# Patient Record
Sex: FEMALE | Race: BLACK OR AFRICAN AMERICAN | NOT HISPANIC OR LATINO
[De-identification: names, ages, dates, MRNs, and addresses within clinical notes are randomized per-mention and may not be internally consistent; named-entity substitution may affect disease eponyms.]

---
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
## 2022-04-20 PROBLEM — Z00.00 ENCOUNTER FOR PREVENTIVE HEALTH EXAMINATION: Status: ACTIVE | Noted: 2022-04-20

## 2022-04-21 ENCOUNTER — APPOINTMENT (OUTPATIENT)
Dept: DERMATOLOGY | Facility: CLINIC | Age: 22
End: 2022-04-21
Payer: COMMERCIAL

## 2022-04-21 PROCEDURE — 99203 OFFICE O/P NEW LOW 30 MIN: CPT

## 2022-04-21 RX ORDER — ESCITALOPRAM OXALATE 5 MG/1
TABLET, FILM COATED ORAL
Refills: 0 | Status: ACTIVE | COMMUNITY

## 2022-04-21 RX ORDER — METFORMIN HYDROCHLORIDE 500 MG/1
500 TABLET, COATED ORAL
Refills: 0 | Status: ACTIVE | COMMUNITY

## 2022-04-21 RX ORDER — INSULIN LISPRO 100 [IU]/ML
(75-25) 100 INJECTION, SUSPENSION SUBCUTANEOUS
Refills: 0 | Status: ACTIVE | COMMUNITY

## 2022-04-21 NOTE — PHYSICAL EXAM
[Alert] : alert [Oriented x 3] : ~L oriented x 3 [FreeTextEntry3] : Type V skin\par \par Face: Mild to moderate small papules and pustules\par Moderate comedones-especially forehead\par Moderate small macular hyperpigmentation, especially forehead\par Back: Mild papules

## 2022-04-21 NOTE — HISTORY OF PRESENT ILLNESS
[FreeTextEntry1] : Acne [de-identified] : First visit for 21-year-old  female with a one-year history of acne on the face.  Self treated with mother's medication (? what).  Menses regular. Has occasional menstrual flares.

## 2022-08-23 ENCOUNTER — APPOINTMENT (OUTPATIENT)
Dept: DERMATOLOGY | Facility: CLINIC | Age: 22
End: 2022-08-23

## 2022-08-23 DIAGNOSIS — L30.8 OTHER SPECIFIED DERMATITIS: ICD-10-CM

## 2022-08-23 PROCEDURE — 99213 OFFICE O/P EST LOW 20 MIN: CPT

## 2022-08-23 NOTE — HISTORY OF PRESENT ILLNESS
[FreeTextEntry1] : Acne [de-identified] : Followup visit for 22-year-old  female per seen by me on April 21, 2022, with a than one year history of acne on the face. Treated with:\par Start 1% clindamycin gel to face and back in a.m. and p.m.\par Start 10% benzoyl peroxide wash in shower - not using this\par Start tretinoin cream 0.05% apply sparingly to face at night

## 2022-08-23 NOTE — PHYSICAL EXAM
[Alert] : alert [Oriented x 3] : ~L oriented x 3 [Well Nourished] : well nourished [FreeTextEntry3] : Face: Rare small papules\par Diffuse mild scaling present\par Back: Mild papules

## 2022-11-22 ENCOUNTER — APPOINTMENT (OUTPATIENT)
Dept: DERMATOLOGY | Facility: CLINIC | Age: 22
End: 2022-11-22

## 2022-11-22 DIAGNOSIS — L70.0 ACNE VULGARIS: ICD-10-CM

## 2022-11-22 PROCEDURE — 99213 OFFICE O/P EST LOW 20 MIN: CPT

## 2022-11-22 RX ORDER — CLINDAMYCIN PHOSPHATE 1 G/10ML
1 GEL TOPICAL
Qty: 1 | Refills: 5 | Status: ACTIVE | COMMUNITY
Start: 2022-04-21 | End: 1900-01-01

## 2022-11-22 RX ORDER — TRETINOIN 0.5 MG/G
0.05 CREAM TOPICAL
Qty: 1 | Refills: 3 | Status: ACTIVE | COMMUNITY
Start: 2022-04-21 | End: 1900-01-01

## 2022-11-22 RX ORDER — HYDROCORTISONE 25 MG/G
2.5 CREAM TOPICAL
Qty: 1 | Refills: 3 | Status: ACTIVE | COMMUNITY
Start: 2022-08-23 | End: 1900-01-01

## 2022-11-22 NOTE — HISTORY OF PRESENT ILLNESS
[FreeTextEntry1] : Acne [de-identified] : Follow-up visit for 22-year-old  female last seen by me on August 23, 2022, with a history of acne on the face.  Treatment modified at the last visit to:\par Decrease 1% clindamycin gel to face to once a day\par Start 10% benzoyl peroxide wash in shower\par Continue tretinoin cream 0.05%–apply sparingly to face at night\par Continue 2-1/2% hydrocortisone cream to face once or twice a day as needed for dryness -patient has not needed this

## 2022-11-22 NOTE — PHYSICAL EXAM
[Alert] : alert [Oriented x 3] : ~L oriented x 3 [Well Nourished] : well nourished [FreeTextEntry3] : Face: Rare large papule on left forehead\par Rare comedones on forehead

## 2023-02-13 ENCOUNTER — INPATIENT (INPATIENT)
Facility: HOSPITAL | Age: 23
LOS: 3 days | Discharge: HOME | DRG: 638 | End: 2023-02-17
Attending: INTERNAL MEDICINE | Admitting: SURGERY
Payer: COMMERCIAL

## 2023-02-13 VITALS — WEIGHT: 177.03 LBS | HEIGHT: 62 IN

## 2023-02-13 LAB
ACETONE SERPL-MCNC: ABNORMAL
ALBUMIN SERPL ELPH-MCNC: 4.1 G/DL — SIGNIFICANT CHANGE UP (ref 3.3–5)
ALP SERPL-CCNC: 136 U/L — HIGH (ref 40–120)
ALT FLD-CCNC: 15 U/L — SIGNIFICANT CHANGE UP (ref 12–78)
ANION GAP SERPL CALC-SCNC: 16 MMOL/L — SIGNIFICANT CHANGE UP (ref 5–17)
AST SERPL-CCNC: 10 U/L — LOW (ref 15–37)
BASE EXCESS BLDV CALC-SCNC: -22.1 MMOL/L — SIGNIFICANT CHANGE UP (ref -2–3)
BASOPHILS # BLD AUTO: 0.05 K/UL — SIGNIFICANT CHANGE UP (ref 0–0.2)
BASOPHILS NFR BLD AUTO: 0.3 % — SIGNIFICANT CHANGE UP (ref 0–2)
BILIRUB SERPL-MCNC: 0.3 MG/DL — SIGNIFICANT CHANGE UP (ref 0.2–1.2)
BUN SERPL-MCNC: 10 MG/DL — SIGNIFICANT CHANGE UP (ref 7–23)
CALCIUM SERPL-MCNC: 9.5 MG/DL — SIGNIFICANT CHANGE UP (ref 8.5–10.1)
CHLORIDE SERPL-SCNC: 106 MMOL/L — SIGNIFICANT CHANGE UP (ref 96–108)
CO2 BLDV-SCNC: 9 MMOL/L — LOW (ref 22–26)
CO2 SERPL-SCNC: 9 MMOL/L — CRITICAL LOW (ref 22–31)
CREAT SERPL-MCNC: 0.94 MG/DL — SIGNIFICANT CHANGE UP (ref 0.5–1.3)
EGFR: 89 ML/MIN/1.73M2 — SIGNIFICANT CHANGE UP
EOSINOPHIL # BLD AUTO: 0.05 K/UL — SIGNIFICANT CHANGE UP (ref 0–0.5)
EOSINOPHIL NFR BLD AUTO: 0.3 % — SIGNIFICANT CHANGE UP (ref 0–6)
GAS PNL BLDV: SIGNIFICANT CHANGE UP
GLUCOSE BLDC GLUCOMTR-MCNC: 253 MG/DL — HIGH (ref 70–99)
GLUCOSE SERPL-MCNC: 302 MG/DL — HIGH (ref 70–99)
HCG SERPL-ACNC: <1 MIU/ML — SIGNIFICANT CHANGE UP
HCO3 BLDV-SCNC: 8 MMOL/L — CRITICAL LOW (ref 22–29)
HCT VFR BLD CALC: 47.7 % — HIGH (ref 34.5–45)
HGB BLD-MCNC: 14.3 G/DL — SIGNIFICANT CHANGE UP (ref 11.5–15.5)
IMM GRANULOCYTES NFR BLD AUTO: 1.5 % — HIGH (ref 0–0.9)
LACTATE SERPL-SCNC: 1.9 MMOL/L — SIGNIFICANT CHANGE UP (ref 0.7–2)
LIDOCAIN IGE QN: 89 U/L — SIGNIFICANT CHANGE UP (ref 73–393)
LYMPHOCYTES # BLD AUTO: 1.41 K/UL — SIGNIFICANT CHANGE UP (ref 1–3.3)
LYMPHOCYTES # BLD AUTO: 8.4 % — LOW (ref 13–44)
MCHC RBC-ENTMCNC: 23.1 PG — LOW (ref 27–34)
MCHC RBC-ENTMCNC: 30 GM/DL — LOW (ref 32–36)
MCV RBC AUTO: 77.2 FL — LOW (ref 80–100)
MONOCYTES # BLD AUTO: 1.4 K/UL — HIGH (ref 0–0.9)
MONOCYTES NFR BLD AUTO: 8.4 % — SIGNIFICANT CHANGE UP (ref 2–14)
NEUTROPHILS # BLD AUTO: 13.56 K/UL — HIGH (ref 1.8–7.4)
NEUTROPHILS NFR BLD AUTO: 81.1 % — HIGH (ref 43–77)
PCO2 BLDV: 34 MMHG — LOW (ref 39–42)
PH BLDV: 7 — LOW (ref 7.32–7.43)
PHOSPHATE SERPL-MCNC: 2.4 MG/DL — LOW (ref 2.5–4.5)
PLATELET # BLD AUTO: 345 K/UL — SIGNIFICANT CHANGE UP (ref 150–400)
PO2 BLDV: 33 MMHG — SIGNIFICANT CHANGE UP (ref 25–45)
POTASSIUM SERPL-MCNC: 5 MMOL/L — SIGNIFICANT CHANGE UP (ref 3.5–5.3)
POTASSIUM SERPL-SCNC: 5 MMOL/L — SIGNIFICANT CHANGE UP (ref 3.5–5.3)
PROT SERPL-MCNC: 9.6 GM/DL — HIGH (ref 6–8.3)
RBC # BLD: 6.18 M/UL — HIGH (ref 3.8–5.2)
RBC # FLD: 15.6 % — HIGH (ref 10.3–14.5)
SAO2 % BLDV: 46 % — SIGNIFICANT CHANGE UP (ref 67–88)
SODIUM SERPL-SCNC: 131 MMOL/L — LOW (ref 135–145)
WBC # BLD: 16.72 K/UL — HIGH (ref 3.8–10.5)
WBC # FLD AUTO: 16.72 K/UL — HIGH (ref 3.8–10.5)

## 2023-02-13 PROCEDURE — 93010 ELECTROCARDIOGRAM REPORT: CPT

## 2023-02-13 PROCEDURE — 74177 CT ABD & PELVIS W/CONTRAST: CPT | Mod: 26,MA

## 2023-02-13 PROCEDURE — 99285 EMERGENCY DEPT VISIT HI MDM: CPT

## 2023-02-13 RX ORDER — ACETAMINOPHEN 500 MG
1000 TABLET ORAL ONCE
Refills: 0 | Status: COMPLETED | OUTPATIENT
Start: 2023-02-13 | End: 2023-02-13

## 2023-02-13 RX ORDER — SODIUM CHLORIDE 9 MG/ML
1000 INJECTION, SOLUTION INTRAVENOUS
Refills: 0 | Status: DISCONTINUED | OUTPATIENT
Start: 2023-02-13 | End: 2023-02-14

## 2023-02-13 RX ORDER — INSULIN HUMAN 100 [IU]/ML
3 INJECTION, SOLUTION SUBCUTANEOUS
Qty: 100 | Refills: 0 | Status: DISCONTINUED | OUTPATIENT
Start: 2023-02-13 | End: 2023-02-15

## 2023-02-13 RX ORDER — SODIUM BICARBONATE 1 MEQ/ML
50 SYRINGE (ML) INTRAVENOUS ONCE
Refills: 0 | Status: COMPLETED | OUTPATIENT
Start: 2023-02-13 | End: 2023-02-13

## 2023-02-13 RX ORDER — SODIUM CHLORIDE 9 MG/ML
2000 INJECTION INTRAMUSCULAR; INTRAVENOUS; SUBCUTANEOUS ONCE
Refills: 0 | Status: COMPLETED | OUTPATIENT
Start: 2023-02-13 | End: 2023-02-13

## 2023-02-13 RX ADMIN — SODIUM CHLORIDE 2000 MILLILITER(S): 9 INJECTION INTRAMUSCULAR; INTRAVENOUS; SUBCUTANEOUS at 22:48

## 2023-02-13 RX ADMIN — Medication 400 MILLIGRAM(S): at 22:47

## 2023-02-13 NOTE — ED STATDOCS - PROGRESS NOTE DETAILS
Yadira Rosales for Dr. Castle:  22 year old female with PMHx of T1DM, depression presents to the ED c/o LLQ abd pain since Saturday. Family reports patient didn't know when her last BM was on Saturday, so she took a laxative and was able to have a BM but hasn't since then. Was seen at  for the pain and UA showed ketones in urine. Patient not currently c/o pain at this time. Sending to [facility] for further eval.

## 2023-02-13 NOTE — ED PROVIDER NOTE - CLINICAL SUMMARY MEDICAL DECISION MAKING FREE TEXT BOX
2310:  results noted.   pt in DKA.   case d/w ICU PA and will evaluate pt 21 y/o female with h/o type I DM in ED c/o abd pain and constipation x 3 days.   states on insulin and BS has been 200-300.   pt states also with decrease PO intake due to constipation.   pt denies any fever, HA, cp, sob, n/v/d.   tolerating PO.   no sick contacts.   states today sugar was 360.   states last small BM 3 days ago after taking laxatives.   pt with soft abd but distended.  h/o DM with mildly elevated FS.  will check EKG, CXR, CT, labs, UA, IVF< meds and likely admit    2310:  results noted.   pt in DKA.   case d/w ICU PA and will evaluate pt 23 y/o female with h/o type I DM in ED c/o abd pain and constipation x 3 days.   states on insulin and BS has been 200-300.   pt states also with decrease PO intake due to constipation.   pt denies any fever, HA, cp, sob, n/v/d.   tolerating PO.   no sick contacts.   states today sugar was 360.   states last small BM 3 days ago after taking laxatives.   pt with soft abd but distended.  h/o DM with mildly elevated FS.  will check EKG, CXR, CT, labs, UA, IVF< meds and likely admit    2310:  results noted.   pt in DKA.   case d/w ICU PA and will evaluate pt    0040:  pt evaluated by [facility] ICU PA and will admit to ICU under Melita

## 2023-02-13 NOTE — ED PROVIDER NOTE - OBJECTIVE STATEMENT
21 y/o female with h/o type I DM in ED c/o abd pain and constipation x 3 days.   states on insulin and BS has been 200-300.   pt states also with decrease PO intake due to constipation.   pt denies any fever, HA, cp, sob, n/v/d.   tolerating PO.   no sick contacts.   states today sugar was 360.   states last small BM 3 days ago after taking laxatives.

## 2023-02-13 NOTE — ED ADULT TRIAGE NOTE - CHIEF COMPLAINT QUOTE
Ambulatory to the ER with c/o LLQ abdominal pain x 2 days and constipation. Patient seen in urgent care today UA showed ketones in urine patient has hx of diabetes, sent to ER for further eval. No medication taken for pain today

## 2023-02-14 DIAGNOSIS — E10.10 TYPE 1 DIABETES MELLITUS WITH KETOACIDOSIS WITHOUT COMA: ICD-10-CM

## 2023-02-14 LAB
ANION GAP SERPL CALC-SCNC: 12 MMOL/L — SIGNIFICANT CHANGE UP (ref 5–17)
ANION GAP SERPL CALC-SCNC: 16 MMOL/L — SIGNIFICANT CHANGE UP (ref 5–17)
ANION GAP SERPL CALC-SCNC: 17 MMOL/L — SIGNIFICANT CHANGE UP (ref 5–17)
ANION GAP SERPL CALC-SCNC: 8 MMOL/L — SIGNIFICANT CHANGE UP (ref 5–17)
APPEARANCE UR: CLEAR — SIGNIFICANT CHANGE UP
BACTERIA # UR AUTO: ABNORMAL
BASE EXCESS BLDV CALC-SCNC: -21.8 MMOL/L — SIGNIFICANT CHANGE UP
BASOPHILS # BLD AUTO: 0.04 K/UL — SIGNIFICANT CHANGE UP (ref 0–0.2)
BASOPHILS NFR BLD AUTO: 0.3 % — SIGNIFICANT CHANGE UP (ref 0–2)
BILIRUB UR-MCNC: NEGATIVE — SIGNIFICANT CHANGE UP
BUN SERPL-MCNC: 5 MG/DL — LOW (ref 7–23)
BUN SERPL-MCNC: 6 MG/DL — LOW (ref 7–23)
BUN SERPL-MCNC: 8 MG/DL — SIGNIFICANT CHANGE UP (ref 7–23)
BUN SERPL-MCNC: 9 MG/DL — SIGNIFICANT CHANGE UP (ref 7–23)
CALCIUM SERPL-MCNC: 8.3 MG/DL — LOW (ref 8.5–10.1)
CALCIUM SERPL-MCNC: 8.4 MG/DL — LOW (ref 8.5–10.1)
CALCIUM SERPL-MCNC: 8.5 MG/DL — SIGNIFICANT CHANGE UP (ref 8.5–10.1)
CALCIUM SERPL-MCNC: 8.8 MG/DL — SIGNIFICANT CHANGE UP (ref 8.5–10.1)
CHLORIDE SERPL-SCNC: 110 MMOL/L — HIGH (ref 96–108)
CHLORIDE SERPL-SCNC: 112 MMOL/L — HIGH (ref 96–108)
CHLORIDE SERPL-SCNC: 112 MMOL/L — HIGH (ref 96–108)
CHLORIDE SERPL-SCNC: 113 MMOL/L — HIGH (ref 96–108)
CO2 BLDV-SCNC: 8 MMOL/L — LOW (ref 22–26)
CO2 SERPL-SCNC: 12 MMOL/L — LOW (ref 22–31)
CO2 SERPL-SCNC: 17 MMOL/L — LOW (ref 22–31)
CO2 SERPL-SCNC: 6 MMOL/L — CRITICAL LOW (ref 22–31)
CO2 SERPL-SCNC: 7 MMOL/L — CRITICAL LOW (ref 22–31)
COLOR SPEC: YELLOW — SIGNIFICANT CHANGE UP
CREAT SERPL-MCNC: 0.55 MG/DL — SIGNIFICANT CHANGE UP (ref 0.5–1.3)
CREAT SERPL-MCNC: 0.69 MG/DL — SIGNIFICANT CHANGE UP (ref 0.5–1.3)
CREAT SERPL-MCNC: 0.81 MG/DL — SIGNIFICANT CHANGE UP (ref 0.5–1.3)
CREAT SERPL-MCNC: 0.91 MG/DL — SIGNIFICANT CHANGE UP (ref 0.5–1.3)
DIFF PNL FLD: ABNORMAL
EGFR: 106 ML/MIN/1.73M2 — SIGNIFICANT CHANGE UP
EGFR: 127 ML/MIN/1.73M2 — SIGNIFICANT CHANGE UP
EGFR: 134 ML/MIN/1.73M2 — SIGNIFICANT CHANGE UP
EGFR: 92 ML/MIN/1.73M2 — SIGNIFICANT CHANGE UP
EOSINOPHIL # BLD AUTO: 0 K/UL — SIGNIFICANT CHANGE UP (ref 0–0.5)
EOSINOPHIL NFR BLD AUTO: 0 % — SIGNIFICANT CHANGE UP (ref 0–6)
EPI CELLS # UR: ABNORMAL
GAS PNL BLDV: SIGNIFICANT CHANGE UP
GLUCOSE BLDC GLUCOMTR-MCNC: 117 MG/DL — HIGH (ref 70–99)
GLUCOSE BLDC GLUCOMTR-MCNC: 122 MG/DL — HIGH (ref 70–99)
GLUCOSE BLDC GLUCOMTR-MCNC: 149 MG/DL — HIGH (ref 70–99)
GLUCOSE BLDC GLUCOMTR-MCNC: 163 MG/DL — HIGH (ref 70–99)
GLUCOSE BLDC GLUCOMTR-MCNC: 167 MG/DL — HIGH (ref 70–99)
GLUCOSE BLDC GLUCOMTR-MCNC: 168 MG/DL — HIGH (ref 70–99)
GLUCOSE BLDC GLUCOMTR-MCNC: 169 MG/DL — HIGH (ref 70–99)
GLUCOSE BLDC GLUCOMTR-MCNC: 171 MG/DL — HIGH (ref 70–99)
GLUCOSE BLDC GLUCOMTR-MCNC: 177 MG/DL — HIGH (ref 70–99)
GLUCOSE BLDC GLUCOMTR-MCNC: 185 MG/DL — HIGH (ref 70–99)
GLUCOSE BLDC GLUCOMTR-MCNC: 194 MG/DL — HIGH (ref 70–99)
GLUCOSE BLDC GLUCOMTR-MCNC: 198 MG/DL — HIGH (ref 70–99)
GLUCOSE BLDC GLUCOMTR-MCNC: 202 MG/DL — HIGH (ref 70–99)
GLUCOSE BLDC GLUCOMTR-MCNC: 204 MG/DL — HIGH (ref 70–99)
GLUCOSE BLDC GLUCOMTR-MCNC: 212 MG/DL — HIGH (ref 70–99)
GLUCOSE BLDC GLUCOMTR-MCNC: 213 MG/DL — HIGH (ref 70–99)
GLUCOSE BLDC GLUCOMTR-MCNC: 217 MG/DL — HIGH (ref 70–99)
GLUCOSE BLDC GLUCOMTR-MCNC: 234 MG/DL — HIGH (ref 70–99)
GLUCOSE BLDC GLUCOMTR-MCNC: 244 MG/DL — HIGH (ref 70–99)
GLUCOSE BLDC GLUCOMTR-MCNC: 257 MG/DL — HIGH (ref 70–99)
GLUCOSE SERPL-MCNC: 123 MG/DL — HIGH (ref 70–99)
GLUCOSE SERPL-MCNC: 217 MG/DL — HIGH (ref 70–99)
GLUCOSE SERPL-MCNC: 218 MG/DL — HIGH (ref 70–99)
GLUCOSE SERPL-MCNC: 288 MG/DL — HIGH (ref 70–99)
GLUCOSE UR QL: 1000 MG/DL
HCO3 BLDV-SCNC: 8 MMOL/L — CRITICAL LOW (ref 22–29)
HCT VFR BLD CALC: 40.4 % — SIGNIFICANT CHANGE UP (ref 34.5–45)
HGB BLD-MCNC: 12.3 G/DL — SIGNIFICANT CHANGE UP (ref 11.5–15.5)
IMM GRANULOCYTES NFR BLD AUTO: 1 % — HIGH (ref 0–0.9)
KETONES UR-MCNC: ABNORMAL
LEUKOCYTE ESTERASE UR-ACNC: ABNORMAL
LYMPHOCYTES # BLD AUTO: 1.78 K/UL — SIGNIFICANT CHANGE UP (ref 1–3.3)
LYMPHOCYTES # BLD AUTO: 14.1 % — SIGNIFICANT CHANGE UP (ref 13–44)
MAGNESIUM SERPL-MCNC: 2 MG/DL — SIGNIFICANT CHANGE UP (ref 1.6–2.6)
MCHC RBC-ENTMCNC: 22.9 PG — LOW (ref 27–34)
MCHC RBC-ENTMCNC: 30.4 GM/DL — LOW (ref 32–36)
MCV RBC AUTO: 75.2 FL — LOW (ref 80–100)
MONOCYTES # BLD AUTO: 1.59 K/UL — HIGH (ref 0–0.9)
MONOCYTES NFR BLD AUTO: 12.6 % — SIGNIFICANT CHANGE UP (ref 2–14)
NEUTROPHILS # BLD AUTO: 9.07 K/UL — HIGH (ref 1.8–7.4)
NEUTROPHILS NFR BLD AUTO: 72 % — SIGNIFICANT CHANGE UP (ref 43–77)
NITRITE UR-MCNC: NEGATIVE — SIGNIFICANT CHANGE UP
PCO2 BLDV: 28 MMHG — LOW (ref 39–42)
PH BLDV: 7.04 — LOW (ref 7.32–7.43)
PH UR: 5 — SIGNIFICANT CHANGE UP (ref 5–8)
PHOSPHATE SERPL-MCNC: 0.9 MG/DL — CRITICAL LOW (ref 2.5–4.5)
PHOSPHATE SERPL-MCNC: 1.4 MG/DL — LOW (ref 2.5–4.5)
PHOSPHATE SERPL-MCNC: 2.2 MG/DL — LOW (ref 2.5–4.5)
PLATELET # BLD AUTO: 290 K/UL — SIGNIFICANT CHANGE UP (ref 150–400)
PO2 BLDV: 48 MMHG — SIGNIFICANT CHANGE UP
POTASSIUM SERPL-MCNC: 3.1 MMOL/L — LOW (ref 3.5–5.3)
POTASSIUM SERPL-MCNC: 3.6 MMOL/L — SIGNIFICANT CHANGE UP (ref 3.5–5.3)
POTASSIUM SERPL-MCNC: 3.6 MMOL/L — SIGNIFICANT CHANGE UP (ref 3.5–5.3)
POTASSIUM SERPL-MCNC: 4.1 MMOL/L — SIGNIFICANT CHANGE UP (ref 3.5–5.3)
POTASSIUM SERPL-SCNC: 3.1 MMOL/L — LOW (ref 3.5–5.3)
POTASSIUM SERPL-SCNC: 3.6 MMOL/L — SIGNIFICANT CHANGE UP (ref 3.5–5.3)
POTASSIUM SERPL-SCNC: 3.6 MMOL/L — SIGNIFICANT CHANGE UP (ref 3.5–5.3)
POTASSIUM SERPL-SCNC: 4.1 MMOL/L — SIGNIFICANT CHANGE UP (ref 3.5–5.3)
PROT UR-MCNC: 15
RAPID RVP RESULT: SIGNIFICANT CHANGE UP
RBC # BLD: 5.37 M/UL — HIGH (ref 3.8–5.2)
RBC # FLD: 15.4 % — HIGH (ref 10.3–14.5)
RBC CASTS # UR COMP ASSIST: ABNORMAL /HPF (ref 0–4)
SAO2 % BLDV: 76.5 % — SIGNIFICANT CHANGE UP
SARS-COV-2 RNA SPEC QL NAA+PROBE: SIGNIFICANT CHANGE UP
SODIUM SERPL-SCNC: 133 MMOL/L — LOW (ref 135–145)
SODIUM SERPL-SCNC: 136 MMOL/L — SIGNIFICANT CHANGE UP (ref 135–145)
SODIUM SERPL-SCNC: 136 MMOL/L — SIGNIFICANT CHANGE UP (ref 135–145)
SODIUM SERPL-SCNC: 137 MMOL/L — SIGNIFICANT CHANGE UP (ref 135–145)
SP GR SPEC: 1.02 — SIGNIFICANT CHANGE UP (ref 1.01–1.02)
UROBILINOGEN FLD QL: NEGATIVE — SIGNIFICANT CHANGE UP
WBC # BLD: 12.6 K/UL — HIGH (ref 3.8–10.5)
WBC # FLD AUTO: 12.6 K/UL — HIGH (ref 3.8–10.5)
WBC UR QL: ABNORMAL /HPF (ref 0–5)

## 2023-02-14 PROCEDURE — 80048 BASIC METABOLIC PNL TOTAL CA: CPT

## 2023-02-14 PROCEDURE — 87491 CHLMYD TRACH DNA AMP PROBE: CPT

## 2023-02-14 PROCEDURE — 84100 ASSAY OF PHOSPHORUS: CPT

## 2023-02-14 PROCEDURE — 76830 TRANSVAGINAL US NON-OB: CPT

## 2023-02-14 PROCEDURE — 85025 COMPLETE CBC W/AUTO DIFF WBC: CPT

## 2023-02-14 PROCEDURE — 83036 HEMOGLOBIN GLYCOSYLATED A1C: CPT

## 2023-02-14 PROCEDURE — 87040 BLOOD CULTURE FOR BACTERIA: CPT

## 2023-02-14 PROCEDURE — 80053 COMPREHEN METABOLIC PANEL: CPT

## 2023-02-14 PROCEDURE — 87086 URINE CULTURE/COLONY COUNT: CPT

## 2023-02-14 PROCEDURE — 99291 CRITICAL CARE FIRST HOUR: CPT

## 2023-02-14 PROCEDURE — 87591 N.GONORRHOEAE DNA AMP PROB: CPT

## 2023-02-14 PROCEDURE — 82962 GLUCOSE BLOOD TEST: CPT

## 2023-02-14 PROCEDURE — 82803 BLOOD GASES ANY COMBINATION: CPT

## 2023-02-14 PROCEDURE — 36415 COLL VENOUS BLD VENIPUNCTURE: CPT

## 2023-02-14 PROCEDURE — 85027 COMPLETE CBC AUTOMATED: CPT

## 2023-02-14 PROCEDURE — 81001 URINALYSIS AUTO W/SCOPE: CPT

## 2023-02-14 PROCEDURE — 83735 ASSAY OF MAGNESIUM: CPT

## 2023-02-14 PROCEDURE — 93975 VASCULAR STUDY: CPT

## 2023-02-14 RX ORDER — SODIUM BICARBONATE 1 MEQ/ML
0.19 SYRINGE (ML) INTRAVENOUS
Qty: 150 | Refills: 0 | Status: DISCONTINUED | OUTPATIENT
Start: 2023-02-14 | End: 2023-02-14

## 2023-02-14 RX ORDER — CEFTRIAXONE 500 MG/1
1000 INJECTION, POWDER, FOR SOLUTION INTRAMUSCULAR; INTRAVENOUS EVERY 24 HOURS
Refills: 0 | Status: DISCONTINUED | OUTPATIENT
Start: 2023-02-14 | End: 2023-02-17

## 2023-02-14 RX ORDER — ESCITALOPRAM OXALATE 10 MG/1
10 TABLET, FILM COATED ORAL DAILY
Refills: 0 | Status: DISCONTINUED | OUTPATIENT
Start: 2023-02-14 | End: 2023-02-17

## 2023-02-14 RX ORDER — MORPHINE SULFATE 50 MG/1
2 CAPSULE, EXTENDED RELEASE ORAL EVERY 4 HOURS
Refills: 0 | Status: DISCONTINUED | OUTPATIENT
Start: 2023-02-14 | End: 2023-02-15

## 2023-02-14 RX ORDER — HYDROXYZINE HCL 10 MG
1 TABLET ORAL
Qty: 0 | Refills: 0 | DISCHARGE

## 2023-02-14 RX ORDER — SODIUM CHLORIDE 9 MG/ML
1000 INJECTION, SOLUTION INTRAVENOUS ONCE
Refills: 0 | Status: COMPLETED | OUTPATIENT
Start: 2023-02-14 | End: 2023-02-14

## 2023-02-14 RX ORDER — METOCLOPRAMIDE HCL 10 MG
10 TABLET ORAL ONCE
Refills: 0 | Status: COMPLETED | OUTPATIENT
Start: 2023-02-14 | End: 2023-02-14

## 2023-02-14 RX ORDER — INSULIN DEGLUDEC 100 U/ML
36 INJECTION, SOLUTION SUBCUTANEOUS
Qty: 0 | Refills: 0 | DISCHARGE

## 2023-02-14 RX ORDER — CHLORHEXIDINE GLUCONATE 213 G/1000ML
1 SOLUTION TOPICAL
Refills: 0 | Status: DISCONTINUED | OUTPATIENT
Start: 2023-02-14 | End: 2023-02-17

## 2023-02-14 RX ORDER — SODIUM CHLORIDE 9 MG/ML
1000 INJECTION, SOLUTION INTRAVENOUS
Refills: 0 | Status: DISCONTINUED | OUTPATIENT
Start: 2023-02-14 | End: 2023-02-15

## 2023-02-14 RX ORDER — SODIUM BICARBONATE 1 MEQ/ML
50 SYRINGE (ML) INTRAVENOUS ONCE
Refills: 0 | Status: COMPLETED | OUTPATIENT
Start: 2023-02-14 | End: 2023-02-14

## 2023-02-14 RX ORDER — ENOXAPARIN SODIUM 100 MG/ML
40 INJECTION SUBCUTANEOUS EVERY 24 HOURS
Refills: 0 | Status: DISCONTINUED | OUTPATIENT
Start: 2023-02-14 | End: 2023-02-15

## 2023-02-14 RX ORDER — ESCITALOPRAM OXALATE 10 MG/1
1 TABLET, FILM COATED ORAL
Qty: 0 | Refills: 0 | DISCHARGE

## 2023-02-14 RX ORDER — POTASSIUM PHOSPHATE, MONOBASIC POTASSIUM PHOSPHATE, DIBASIC 236; 224 MG/ML; MG/ML
30 INJECTION, SOLUTION INTRAVENOUS ONCE
Refills: 0 | Status: COMPLETED | OUTPATIENT
Start: 2023-02-14 | End: 2023-02-14

## 2023-02-14 RX ORDER — CEFTRIAXONE 500 MG/1
1000 INJECTION, POWDER, FOR SOLUTION INTRAMUSCULAR; INTRAVENOUS EVERY 24 HOURS
Refills: 0 | Status: DISCONTINUED | OUTPATIENT
Start: 2023-02-14 | End: 2023-02-14

## 2023-02-14 RX ORDER — DIAZEPAM 5 MG
0.5 TABLET ORAL ONCE
Refills: 0 | Status: DISCONTINUED | OUTPATIENT
Start: 2023-02-14 | End: 2023-02-14

## 2023-02-14 RX ORDER — POLYETHYLENE GLYCOL 3350 17 G/17G
17 POWDER, FOR SOLUTION ORAL ONCE
Refills: 0 | Status: COMPLETED | OUTPATIENT
Start: 2023-02-14 | End: 2023-02-14

## 2023-02-14 RX ORDER — ACETAMINOPHEN 500 MG
650 TABLET ORAL EVERY 6 HOURS
Refills: 0 | Status: DISCONTINUED | OUTPATIENT
Start: 2023-02-14 | End: 2023-02-17

## 2023-02-14 RX ORDER — MORPHINE SULFATE 50 MG/1
2 CAPSULE, EXTENDED RELEASE ORAL ONCE
Refills: 0 | Status: DISCONTINUED | OUTPATIENT
Start: 2023-02-14 | End: 2023-02-14

## 2023-02-14 RX ORDER — LEVOCETIRIZINE DIHYDROCHLORIDE 0.5 MG/ML
1 SOLUTION ORAL
Qty: 0 | Refills: 0 | DISCHARGE

## 2023-02-14 RX ADMIN — SODIUM CHLORIDE 125 MILLILITER(S): 9 INJECTION, SOLUTION INTRAVENOUS at 11:37

## 2023-02-14 RX ADMIN — Medication 50 MILLILITER(S): at 03:14

## 2023-02-14 RX ADMIN — Medication 650 MILLIGRAM(S): at 16:52

## 2023-02-14 RX ADMIN — CHLORHEXIDINE GLUCONATE 1 APPLICATION(S): 213 SOLUTION TOPICAL at 05:23

## 2023-02-14 RX ADMIN — SODIUM CHLORIDE 125 MILLILITER(S): 9 INJECTION, SOLUTION INTRAVENOUS at 20:17

## 2023-02-14 RX ADMIN — CEFTRIAXONE 1000 MILLIGRAM(S): 500 INJECTION, POWDER, FOR SOLUTION INTRAMUSCULAR; INTRAVENOUS at 05:05

## 2023-02-14 RX ADMIN — Medication 0.5 MILLIGRAM(S): at 03:06

## 2023-02-14 RX ADMIN — SODIUM CHLORIDE 125 MILLILITER(S): 9 INJECTION, SOLUTION INTRAVENOUS at 02:58

## 2023-02-14 RX ADMIN — MORPHINE SULFATE 2 MILLIGRAM(S): 50 CAPSULE, EXTENDED RELEASE ORAL at 06:17

## 2023-02-14 RX ADMIN — SODIUM CHLORIDE 125 MILLILITER(S): 9 INJECTION, SOLUTION INTRAVENOUS at 04:19

## 2023-02-14 RX ADMIN — Medication 10 MILLIGRAM(S): at 02:29

## 2023-02-14 RX ADMIN — ESCITALOPRAM OXALATE 10 MILLIGRAM(S): 10 TABLET, FILM COATED ORAL at 13:03

## 2023-02-14 RX ADMIN — Medication 50 MILLIEQUIVALENT(S): at 00:21

## 2023-02-14 RX ADMIN — Medication 650 MILLIGRAM(S): at 17:49

## 2023-02-14 RX ADMIN — Medication 10 MILLIGRAM(S): at 02:28

## 2023-02-14 RX ADMIN — POLYETHYLENE GLYCOL 3350 17 GRAM(S): 17 POWDER, FOR SOLUTION ORAL at 09:42

## 2023-02-14 RX ADMIN — INSULIN HUMAN 4 UNIT(S)/HR: 100 INJECTION, SOLUTION SUBCUTANEOUS at 02:21

## 2023-02-14 RX ADMIN — INSULIN HUMAN 4 UNIT(S)/HR: 100 INJECTION, SOLUTION SUBCUTANEOUS at 11:37

## 2023-02-14 RX ADMIN — ENOXAPARIN SODIUM 40 MILLIGRAM(S): 100 INJECTION SUBCUTANEOUS at 02:28

## 2023-02-14 RX ADMIN — POTASSIUM PHOSPHATE, MONOBASIC POTASSIUM PHOSPHATE, DIBASIC 83.33 MILLIMOLE(S): 236; 224 INJECTION, SOLUTION INTRAVENOUS at 16:27

## 2023-02-14 RX ADMIN — MORPHINE SULFATE 2 MILLIGRAM(S): 50 CAPSULE, EXTENDED RELEASE ORAL at 06:32

## 2023-02-14 RX ADMIN — SODIUM CHLORIDE 1000 MILLILITER(S): 9 INJECTION, SOLUTION INTRAVENOUS at 01:21

## 2023-02-14 RX ADMIN — POTASSIUM PHOSPHATE, MONOBASIC POTASSIUM PHOSPHATE, DIBASIC 83.33 MILLIMOLE(S): 236; 224 INJECTION, SOLUTION INTRAVENOUS at 09:42

## 2023-02-14 NOTE — PROGRESS NOTE ADULT - ASSESSMENT
22 year old known Type I Diabetic admitted with DKA.  no clear instigating cause,   has some abdominal pain,   ct ? pyelo, check  urine clture on rocephin  miralax for constipation  replace magnesium/phosphate  insulin drip

## 2023-02-14 NOTE — H&P ADULT - HISTORY OF PRESENT ILLNESS
22F with h/o type I DM in ED c/o abd pain and constipation x 3 days.      Abdominal pain began on Saturday and patient states that she didn't know when her last bowel movement was. Parents tried giving her laxatives and resulted in a very small BM on Sat, but no passing or any further movements or gas since. She has been burping up food that she has eaten over previous days and complains of abdominal distension. Patients glucose is typically addressed with basal bolus insulin with 36U basal at night and bolus insulin adjustments calculated based on her meals. She has had decreased PO intake secondary to stomach pain and thus insulin dosing has been sporadic. Upon arrival in the ED. she presents with ketoacidosis with a bicarbonate level of 9 and acetone in the blood with ketonuria and glucosuria.

## 2023-02-14 NOTE — PHARMACOTHERAPY INTERVENTION NOTE - COMMENTS
Medication reconciliation complete.  Home medications reviewed with the patient and her mother at bedside and confirmed against Dr. First medhx.    Home Medications:  HumaLOG 100 units/mL subcutaneous solution: With meals (14 Feb 2023 09:45)  hydrOXYzine hydrochloride 25 mg oral tablet: 1 tab(s) orally once a day (at bedtime), As Needed (14 Feb 2023 09:45)  levocetirizine 5 mg oral tablet: 1 tab(s) orally once a day (14 Feb 2023 09:45)  Lexapro 10 mg oral tablet: 1 tab(s) orally once a day (14 Feb 2023 09:45)  metFORMIN 500 mg oral tablet: 1 tab(s) orally 2 times a day (14 Feb 2023 09:45)  Tresiba 100 units/mL subcutaneous solution: 36 unit(s) subcutaneous once a day (in the morning) (14 Feb 2023 09:45)

## 2023-02-14 NOTE — PROVIDER CONTACT NOTE (EICU) - SITUATION
23 y/o female with h/o type I DM in ED c/o abd pain and constipation x 3 days.   states on insulin and BS has been 200-300.   pt states also with decrease PO intake due to constipation.   pt denies any fever, HA, cp, sob, n/v/d.   tolerating PO.   no sick contacts.   states today sugar was 360.   states last small BM 3 days ago after taking laxatives.  Case discussed with the ICU PA.

## 2023-02-14 NOTE — H&P ADULT - NSHPLABSRESULTS_GEN_ALL_CORE
< from: CT Abdomen and Pelvis w/ IV Cont (02.13.23 @ 22:42) >      ACC: [id number] EXAM:  CT ABDOMEN AND PELVIS IC   ORDERED BY: LIA HERNÁNDEZ     PROCEDURE DATE:  02/13/2023          INTERPRETATION:  CLINICAL INFORMATION: Left lower quadrant pain.    COMPARISON: None.    CONTRAST/COMPLICATIONS:  IV Contrast: Omnipaque 350  90 cc administered   10 cc discarded  Oral Contrast: NONE  Complications: None reported at time of study completion    PROCEDURE:  CT of the Abdomen and Pelvis was performed.  Sagittal and coronal reformats were performed.    FINDINGS: Exam is mildly degraded by motion artifact.  LOWER CHEST: Within normal limits.    LIVER: Within normal limits.  BILE DUCTS: Normal caliber.  GALLBLADDER: Within normal limits.  SPLEEN: Within normal limits.  PANCREAS: Within normal limits.  ADRENALS: Within normal limits.  KIDNEYS/URETERS: No hydronephrosis. Question linear area of   hypoenhancement within the right interpolar kidney (example 602, 52).    BLADDER: Within normal limits.  REPRODUCTIVE ORGANS: Left corpus luteum. Malpositioned IUD in the lower   uterine segment and extending into the cervix with angulated appearance   of the arms in uncertain location.    BOWEL: No bowel obstruction. Distended stomach. Appendix minimally   distended up to 7 mm with fecal material. Noappendiceal inflammation.  PERITONEUM: Small free pelvic fluid.  VESSELS: Within normal limits.  RETROPERITONEUM/LYMPH NODES: No lymphadenopathy.  ABDOMINAL WALL: Within normal limits.  BONES: Within normal limits.    IMPRESSION:  Exam mildly degraded by motion artifact.    Question linear area of hypoenhancement in the right kidney versus   artifact. Recommend correlation with urinalysis for infection.    Distended stomach.    Minimally distended appendix without associated periappendiceal   inflammation.    Malpositioned IUD.        --- End of Report ---            KIRIT RODRIGUEZ MD; Attending Radiologist  This document has been electronically signed. Feb 13 2023 11:43PM    < end of copied text >

## 2023-02-14 NOTE — H&P ADULT - NSHPPHYSICALEXAM_GEN_ALL_CORE
PHYSICAL EXAM:  GENERAL: NAD, lying in bed comfortably  HEAD:  Atraumatic, Normocephalic  EYES: EOMI, PERRL, conjunctiva and sclera clear  ENT: Moist mucous membranes  NECK: Supple, No JVD  CHEST/LUNG: Clear to auscultation bilaterally; No rales, rhonchi, wheezing, or rubs. Unlabored respirations  HEART: Regular rate and rhythm; No murmurs, rubs, or gallops  ABDOMEN: Soft, Nontender, mild TTP to LLQ, distended   EXTREMITIES:  2+ Peripheral Pulses, brisk capillary refill. No clubbing, cyanosis, or edema  NERVOUS SYSTEM:  Alert & Oriented X3, speech clear. No facial droop, tongue protrusion midline. Answers questions appropriately. Full and equal 5/5 strength B/L upper and lower extremities. +reflexes B/L LE. Sensation intact. No deficits   MSK: FROM x 4 extremities   SKIN: No rashes or lesions

## 2023-02-14 NOTE — ED ADULT NURSE NOTE - NSIMPLEMENTINTERV_GEN_ALL_ED
Implemented All Universal Safety Interventions:  [place] to call system. Call bell, personal items and telephone within reach. Instruct patient to call for assistance. Room bathroom lighting operational. Non-slip footwear when patient is off stretcher. Physically safe environment: no spills, clutter or unnecessary equipment. Stretcher in lowest position, wheels locked, appropriate side rails in place.

## 2023-02-14 NOTE — PATIENT PROFILE ADULT - FALL HARM RISK - HARM RISK INTERVENTIONS
Assistance with ambulation/Communicate Risk of Fall with Harm to all staff/Reinforce activity limits and safety measures with patient and family/Tailored Fall Risk Interventions/Visual Cue: Yellow wristband and red socks/Bed in lowest position, wheels locked, appropriate side rails in place/Call bell, personal items and telephone in reach/Instruct patient to call for assistance before getting out of bed or chair/Non-slip footwear when patient is out of bed/[place] to call system/Physically safe environment - no spills, clutter or unnecessary equipment/Purposeful Proactive Rounding/Room/bathroom lighting operational, light cord in reach Assistance with ambulation/Assistance OOB with selected safe patient handling equipment/Communicate Risk of Fall with Harm to all staff/Monitor for mental status changes/Monitor gait and stability/Move patient closer to nurses' station/Reinforce activity limits and safety measures with patient and family/Reorient to person, place and time as needed/Review medications for side effects contributing to fall risk/Sit up slowly, dangle for a short time, stand at bedside before walking/Tailored Fall Risk Interventions/Toileting schedule using arm’s reach rule for commode and bathroom/Use of alarms - bed, chair and/or voice tab/Visual Cue: Yellow wristband and red socks/Bed in lowest position, wheels locked, appropriate side rails in place/Call bell, personal items and telephone in reach/Instruct patient to call for assistance before getting out of bed or chair/Non-slip footwear when patient is out of bed/[place] to call system/Physically safe environment - no spills, clutter or unnecessary equipment/Purposeful Proactive Rounding/Room/bathroom lighting operational, light cord in reach

## 2023-02-14 NOTE — PATIENT PROFILE ADULT - NSPROGENSOURCEINFO_GEN_A_NUR
patient/family documentation/consultation with other physicians/direct patient care (not related to procedure)/consult w/ pt's family directly relating to pts condition/additional history taking/interpretation of diagnostic studies

## 2023-02-14 NOTE — H&P ADULT - ASSESSMENT
Type 1 diabetic presenting with ketoacidosis, gastroparesis, abdominal distension with reduced PO intake and possible superimposed starvation ketosis on mild DKA. ABCs stable     - 2 amps bicarb given total   - Insulin gtt started; protocol in place   - 3L IVF given   - D5LR at 125/hr   - trending labs  - CT abdomin and pelvis - no SBO noted - possible perinephric stranding correlating with leukocytosis and positive UA; urine cx pending; ceftriaxone started   - dvt ppx  - NPO  - reglan and dulcolax for gastroparesis and constipation

## 2023-02-15 LAB
ANION GAP SERPL CALC-SCNC: 9 MMOL/L — SIGNIFICANT CHANGE UP (ref 5–17)
BUN SERPL-MCNC: 3 MG/DL — LOW (ref 7–23)
CALCIUM SERPL-MCNC: 8.5 MG/DL — SIGNIFICANT CHANGE UP (ref 8.5–10.1)
CHLORIDE SERPL-SCNC: 109 MMOL/L — HIGH (ref 96–108)
CO2 SERPL-SCNC: 18 MMOL/L — LOW (ref 22–31)
CREAT SERPL-MCNC: 0.5 MG/DL — SIGNIFICANT CHANGE UP (ref 0.5–1.3)
CULTURE RESULTS: SIGNIFICANT CHANGE UP
EGFR: 137 ML/MIN/1.73M2 — SIGNIFICANT CHANGE UP
GLUCOSE BLDC GLUCOMTR-MCNC: 137 MG/DL — HIGH (ref 70–99)
GLUCOSE BLDC GLUCOMTR-MCNC: 181 MG/DL — HIGH (ref 70–99)
GLUCOSE BLDC GLUCOMTR-MCNC: 197 MG/DL — HIGH (ref 70–99)
GLUCOSE BLDC GLUCOMTR-MCNC: 210 MG/DL — HIGH (ref 70–99)
GLUCOSE BLDC GLUCOMTR-MCNC: 211 MG/DL — HIGH (ref 70–99)
GLUCOSE BLDC GLUCOMTR-MCNC: 226 MG/DL — HIGH (ref 70–99)
GLUCOSE BLDC GLUCOMTR-MCNC: 227 MG/DL — HIGH (ref 70–99)
GLUCOSE BLDC GLUCOMTR-MCNC: 252 MG/DL — HIGH (ref 70–99)
GLUCOSE BLDC GLUCOMTR-MCNC: 256 MG/DL — HIGH (ref 70–99)
GLUCOSE BLDC GLUCOMTR-MCNC: 263 MG/DL — HIGH (ref 70–99)
GLUCOSE SERPL-MCNC: 213 MG/DL — HIGH (ref 70–99)
HCT VFR BLD CALC: 35.7 % — SIGNIFICANT CHANGE UP (ref 34.5–45)
HGB BLD-MCNC: 11.4 G/DL — LOW (ref 11.5–15.5)
MCHC RBC-ENTMCNC: 23.1 PG — LOW (ref 27–34)
MCHC RBC-ENTMCNC: 31.9 GM/DL — LOW (ref 32–36)
MCV RBC AUTO: 72.4 FL — LOW (ref 80–100)
PHOSPHATE SERPL-MCNC: 1.7 MG/DL — LOW (ref 2.5–4.5)
PLATELET # BLD AUTO: 276 K/UL — SIGNIFICANT CHANGE UP (ref 150–400)
POTASSIUM SERPL-MCNC: 2.8 MMOL/L — CRITICAL LOW (ref 3.5–5.3)
POTASSIUM SERPL-SCNC: 2.8 MMOL/L — CRITICAL LOW (ref 3.5–5.3)
RBC # BLD: 4.93 M/UL — SIGNIFICANT CHANGE UP (ref 3.8–5.2)
RBC # FLD: 15 % — HIGH (ref 10.3–14.5)
SODIUM SERPL-SCNC: 136 MMOL/L — SIGNIFICANT CHANGE UP (ref 135–145)
SPECIMEN SOURCE: SIGNIFICANT CHANGE UP
WBC # BLD: 8.81 K/UL — SIGNIFICANT CHANGE UP (ref 3.8–10.5)
WBC # FLD AUTO: 8.81 K/UL — SIGNIFICANT CHANGE UP (ref 3.8–10.5)

## 2023-02-15 PROCEDURE — 99233 SBSQ HOSP IP/OBS HIGH 50: CPT

## 2023-02-15 RX ORDER — DEXTROSE 50 % IN WATER 50 %
15 SYRINGE (ML) INTRAVENOUS ONCE
Refills: 0 | Status: DISCONTINUED | OUTPATIENT
Start: 2023-02-15 | End: 2023-02-17

## 2023-02-15 RX ORDER — GLUCAGON INJECTION, SOLUTION 0.5 MG/.1ML
1 INJECTION, SOLUTION SUBCUTANEOUS ONCE
Refills: 0 | Status: DISCONTINUED | OUTPATIENT
Start: 2023-02-15 | End: 2023-02-17

## 2023-02-15 RX ORDER — POTASSIUM CHLORIDE 20 MEQ
40 PACKET (EA) ORAL EVERY 4 HOURS
Refills: 0 | Status: COMPLETED | OUTPATIENT
Start: 2023-02-15 | End: 2023-02-15

## 2023-02-15 RX ORDER — DEXTROSE 50 % IN WATER 50 %
25 SYRINGE (ML) INTRAVENOUS ONCE
Refills: 0 | Status: DISCONTINUED | OUTPATIENT
Start: 2023-02-15 | End: 2023-02-17

## 2023-02-15 RX ORDER — INSULIN LISPRO 100/ML
VIAL (ML) SUBCUTANEOUS
Refills: 0 | Status: DISCONTINUED | OUTPATIENT
Start: 2023-02-15 | End: 2023-02-15

## 2023-02-15 RX ORDER — DEXTROSE 50 % IN WATER 50 %
12.5 SYRINGE (ML) INTRAVENOUS ONCE
Refills: 0 | Status: DISCONTINUED | OUTPATIENT
Start: 2023-02-15 | End: 2023-02-17

## 2023-02-15 RX ORDER — INSULIN LISPRO 100/ML
VIAL (ML) SUBCUTANEOUS
Refills: 0 | Status: DISCONTINUED | OUTPATIENT
Start: 2023-02-15 | End: 2023-02-17

## 2023-02-15 RX ORDER — SODIUM CHLORIDE 9 MG/ML
1000 INJECTION, SOLUTION INTRAVENOUS
Refills: 0 | Status: DISCONTINUED | OUTPATIENT
Start: 2023-02-15 | End: 2023-02-17

## 2023-02-15 RX ORDER — ONDANSETRON 8 MG/1
4 TABLET, FILM COATED ORAL ONCE
Refills: 0 | Status: COMPLETED | OUTPATIENT
Start: 2023-02-15 | End: 2023-02-15

## 2023-02-15 RX ORDER — POTASSIUM CHLORIDE 20 MEQ
40 PACKET (EA) ORAL EVERY 4 HOURS
Refills: 0 | Status: DISCONTINUED | OUTPATIENT
Start: 2023-02-15 | End: 2023-02-15

## 2023-02-15 RX ORDER — INSULIN GLARGINE 100 [IU]/ML
36 INJECTION, SOLUTION SUBCUTANEOUS ONCE
Refills: 0 | Status: COMPLETED | OUTPATIENT
Start: 2023-02-15 | End: 2023-02-15

## 2023-02-15 RX ORDER — ACETAMINOPHEN 500 MG
1000 TABLET ORAL ONCE
Refills: 0 | Status: COMPLETED | OUTPATIENT
Start: 2023-02-15 | End: 2023-02-15

## 2023-02-15 RX ORDER — INSULIN LISPRO 100/ML
VIAL (ML) SUBCUTANEOUS AT BEDTIME
Refills: 0 | Status: DISCONTINUED | OUTPATIENT
Start: 2023-02-15 | End: 2023-02-17

## 2023-02-15 RX ORDER — INSULIN GLARGINE 100 [IU]/ML
36 INJECTION, SOLUTION SUBCUTANEOUS EVERY MORNING
Refills: 0 | Status: DISCONTINUED | OUTPATIENT
Start: 2023-02-15 | End: 2023-02-17

## 2023-02-15 RX ORDER — INSULIN LISPRO 100/ML
2 VIAL (ML) SUBCUTANEOUS ONCE
Refills: 0 | Status: COMPLETED | OUTPATIENT
Start: 2023-02-15 | End: 2023-02-15

## 2023-02-15 RX ADMIN — ESCITALOPRAM OXALATE 10 MILLIGRAM(S): 10 TABLET, FILM COATED ORAL at 11:07

## 2023-02-15 RX ADMIN — Medication 6: at 15:34

## 2023-02-15 RX ADMIN — Medication 400 MILLIGRAM(S): at 01:02

## 2023-02-15 RX ADMIN — Medication 1000 MILLIGRAM(S): at 01:17

## 2023-02-15 RX ADMIN — ENOXAPARIN SODIUM 40 MILLIGRAM(S): 100 INJECTION SUBCUTANEOUS at 02:28

## 2023-02-15 RX ADMIN — Medication 40 MILLIEQUIVALENT(S): at 11:07

## 2023-02-15 RX ADMIN — Medication 62.5 MILLIMOLE(S): at 11:08

## 2023-02-15 RX ADMIN — Medication 650 MILLIGRAM(S): at 06:41

## 2023-02-15 RX ADMIN — Medication 40 MILLIEQUIVALENT(S): at 15:55

## 2023-02-15 RX ADMIN — INSULIN GLARGINE 36 UNIT(S): 100 INJECTION, SOLUTION SUBCUTANEOUS at 11:08

## 2023-02-15 RX ADMIN — INSULIN HUMAN 3 UNIT(S)/HR: 100 INJECTION, SOLUTION SUBCUTANEOUS at 02:28

## 2023-02-15 RX ADMIN — Medication 40 MILLIEQUIVALENT(S): at 21:37

## 2023-02-15 RX ADMIN — SODIUM CHLORIDE 125 MILLILITER(S): 9 INJECTION, SOLUTION INTRAVENOUS at 06:11

## 2023-02-15 RX ADMIN — Medication 400 MILLIGRAM(S): at 13:10

## 2023-02-15 RX ADMIN — Medication 1000 MILLIGRAM(S): at 13:30

## 2023-02-15 RX ADMIN — INSULIN HUMAN 3 UNIT(S)/HR: 100 INJECTION, SOLUTION SUBCUTANEOUS at 06:30

## 2023-02-15 RX ADMIN — Medication 2 UNIT(S): at 22:49

## 2023-02-15 RX ADMIN — CEFTRIAXONE 1000 MILLIGRAM(S): 500 INJECTION, POWDER, FOR SOLUTION INTRAMUSCULAR; INTRAVENOUS at 06:12

## 2023-02-15 RX ADMIN — CHLORHEXIDINE GLUCONATE 1 APPLICATION(S): 213 SOLUTION TOPICAL at 06:12

## 2023-02-15 RX ADMIN — Medication 650 MILLIGRAM(S): at 07:10

## 2023-02-15 RX ADMIN — ONDANSETRON 4 MILLIGRAM(S): 8 TABLET, FILM COATED ORAL at 12:28

## 2023-02-15 NOTE — PROGRESS NOTE ADULT - ASSESSMENT
22 year old known Type I Diabetic admitted with DKA.  no clear instigating cause, although ct ? pyelo had temp to 101 last night  ct ? pyelo, check  urine clture on rocephin  miralax for constipation had bowel movement  replace magnesium/phosphate  anion gap closed, will transfer to [facility], was on tresiba at home 36 q am   22 year old known Type I Diabetic admitted with DKA.  no clear instigating cause, although ct ? pyelo had temp to 101 last night  ct ? pyelo, check  urine clture on rocephin  miralax for constipation had bowel movement  replace magnesium/phosphate  anion gap closed, will transfer to [facility], was on tresiba at home 36 q am    can transferred to floor signed out to hospitalist Thalia

## 2023-02-16 LAB
A1C WITH ESTIMATED AVERAGE GLUCOSE RESULT: 15.2 % — HIGH (ref 4–5.6)
ANION GAP SERPL CALC-SCNC: 9 MMOL/L — SIGNIFICANT CHANGE UP (ref 5–17)
BUN SERPL-MCNC: 3 MG/DL — LOW (ref 7–23)
CALCIUM SERPL-MCNC: 8.4 MG/DL — LOW (ref 8.5–10.1)
CHLORIDE SERPL-SCNC: 107 MMOL/L — SIGNIFICANT CHANGE UP (ref 96–108)
CO2 SERPL-SCNC: 22 MMOL/L — SIGNIFICANT CHANGE UP (ref 22–31)
CREAT SERPL-MCNC: 0.47 MG/DL — LOW (ref 0.5–1.3)
EGFR: 139 ML/MIN/1.73M2 — SIGNIFICANT CHANGE UP
ESTIMATED AVERAGE GLUCOSE: 390 MG/DL — HIGH (ref 68–114)
GLUCOSE BLDC GLUCOMTR-MCNC: 196 MG/DL — HIGH (ref 70–99)
GLUCOSE BLDC GLUCOMTR-MCNC: 206 MG/DL — HIGH (ref 70–99)
GLUCOSE BLDC GLUCOMTR-MCNC: 226 MG/DL — HIGH (ref 70–99)
GLUCOSE BLDC GLUCOMTR-MCNC: 236 MG/DL — HIGH (ref 70–99)
GLUCOSE SERPL-MCNC: 206 MG/DL — HIGH (ref 70–99)
HCT VFR BLD CALC: 33.5 % — LOW (ref 34.5–45)
HGB BLD-MCNC: 10.7 G/DL — LOW (ref 11.5–15.5)
MAGNESIUM SERPL-MCNC: 1.7 MG/DL — SIGNIFICANT CHANGE UP (ref 1.6–2.6)
MCHC RBC-ENTMCNC: 23.1 PG — LOW (ref 27–34)
MCHC RBC-ENTMCNC: 31.9 GM/DL — LOW (ref 32–36)
MCV RBC AUTO: 72.4 FL — LOW (ref 80–100)
PHOSPHATE SERPL-MCNC: 2.5 MG/DL — SIGNIFICANT CHANGE UP (ref 2.5–4.5)
PLATELET # BLD AUTO: 276 K/UL — SIGNIFICANT CHANGE UP (ref 150–400)
POTASSIUM SERPL-MCNC: 2.7 MMOL/L — CRITICAL LOW (ref 3.5–5.3)
POTASSIUM SERPL-SCNC: 2.7 MMOL/L — CRITICAL LOW (ref 3.5–5.3)
RBC # BLD: 4.63 M/UL — SIGNIFICANT CHANGE UP (ref 3.8–5.2)
RBC # FLD: 14.9 % — HIGH (ref 10.3–14.5)
SODIUM SERPL-SCNC: 138 MMOL/L — SIGNIFICANT CHANGE UP (ref 135–145)
WBC # BLD: 7.77 K/UL — SIGNIFICANT CHANGE UP (ref 3.8–10.5)
WBC # FLD AUTO: 7.77 K/UL — SIGNIFICANT CHANGE UP (ref 3.8–10.5)

## 2023-02-16 PROCEDURE — 93975 VASCULAR STUDY: CPT | Mod: 26

## 2023-02-16 PROCEDURE — 76830 TRANSVAGINAL US NON-OB: CPT | Mod: 26

## 2023-02-16 PROCEDURE — 99233 SBSQ HOSP IP/OBS HIGH 50: CPT

## 2023-02-16 RX ORDER — POTASSIUM CHLORIDE 20 MEQ
40 PACKET (EA) ORAL EVERY 4 HOURS
Refills: 0 | Status: COMPLETED | OUTPATIENT
Start: 2023-02-16 | End: 2023-02-17

## 2023-02-16 RX ORDER — POTASSIUM CHLORIDE 20 MEQ
40 PACKET (EA) ORAL
Refills: 0 | Status: DISCONTINUED | OUTPATIENT
Start: 2023-02-16 | End: 2023-02-16

## 2023-02-16 RX ORDER — POTASSIUM CHLORIDE 20 MEQ
10 PACKET (EA) ORAL
Refills: 0 | Status: COMPLETED | OUTPATIENT
Start: 2023-02-16 | End: 2023-02-16

## 2023-02-16 RX ORDER — POTASSIUM CHLORIDE 20 MEQ
40 PACKET (EA) ORAL EVERY 4 HOURS
Refills: 0 | Status: DISCONTINUED | OUTPATIENT
Start: 2023-02-16 | End: 2023-02-16

## 2023-02-16 RX ADMIN — Medication 40 MILLIEQUIVALENT(S): at 18:42

## 2023-02-16 RX ADMIN — CEFTRIAXONE 1000 MILLIGRAM(S): 500 INJECTION, POWDER, FOR SOLUTION INTRAMUSCULAR; INTRAVENOUS at 05:03

## 2023-02-16 RX ADMIN — INSULIN GLARGINE 36 UNIT(S): 100 INJECTION, SOLUTION SUBCUTANEOUS at 07:47

## 2023-02-16 RX ADMIN — CHLORHEXIDINE GLUCONATE 1 APPLICATION(S): 213 SOLUTION TOPICAL at 06:58

## 2023-02-16 RX ADMIN — Medication 40 MILLIEQUIVALENT(S): at 21:13

## 2023-02-16 RX ADMIN — Medication 100 MILLIEQUIVALENT(S): at 16:24

## 2023-02-16 RX ADMIN — Medication 4: at 17:06

## 2023-02-16 RX ADMIN — Medication 4: at 12:43

## 2023-02-16 RX ADMIN — Medication 2: at 07:46

## 2023-02-16 RX ADMIN — ESCITALOPRAM OXALATE 10 MILLIGRAM(S): 10 TABLET, FILM COATED ORAL at 11:59

## 2023-02-16 RX ADMIN — Medication 100 MILLIEQUIVALENT(S): at 14:38

## 2023-02-16 RX ADMIN — Medication 100 MILLIEQUIVALENT(S): at 12:44

## 2023-02-16 NOTE — CONSULT NOTE ADULT - ASSESSMENT
22y G0 LMP 2/3/2023  admitted to hospitalist team  for management of DKA and gastroparesis in setting of Type I DM  HD#4    #abdominal pain  -improved since started bowel regimen, possibly contributed to by constipation  -malpositioned IUD on CT; however, patient reporting no abdominal pain, AUB, or symptoms associated with IUD prior to this admission  -pending pelvic sono to determine position  -urine gonorrhea/chlamydia to assess for PID  -discussed with patient and mother the differential diagnosis and management options  -once sono results, will do pelvic exam to visualize strings; may discontinue if IUD is at cervical os or not positioned in intrauterine cavity on sono  -if urine GC/CT and pelvic exam raise clinical suspicion of PID, will treat with antibiotics but IUD does not need to be taken out in setting of PID  -Dispo: pending sono and physical exam    discussed with attending Dr Hathaway   22y G0 LMP 2/3/2023  admitted to hospitalist team  for management of DKA and gastroparesis in setting of Type I DM  HD#4    #abdominal pain  -improved since started bowel regimen, possibly contributed to by constipation  -malpositioned IUD on CT; however, patient reporting no abdominal pain, AUB, or symptoms associated with IUD prior to this admission  -pending pelvic sono to determine position  -urine gonorrhea/chlamydia to assess for PID  -discussed with patient and mother the differential diagnosis and management options  -once sono results, will do pelvic exam to visualize strings; may discontinue if IUD is at cervical os or not positioned in intrauterine cavity on sono  -if urine GC/CT and pelvic exam raise clinical suspicion of PID, will treat with antibiotics but IUD does not need to be taken out in setting of PID  -Dispo: pending sono and physical exam      -----------02-16-23 @ 18:04    Patient re-evaluated after sono results  - US showing IUD in lower uterine segment, in intrauterine cavity, not embedded in myometrium or in vaginal canal  - physical exam consistent with sono findings  - patient and mother also mentioned that the patient is having urge incontinence, feeling incomplete emptying, and feels like "overflowing" and currently has UTI; patient already with diabetic neuropathy, but no known diagnosis of neurogenic bladder. encouraged to keep a voiding journal; patient's mother is considering making appointment with her own urogyn, Dr Turner; consider post-void residual with bladder scan upon next void in-hospital    not requiring acute gyn intervention at this time  follow up with gyn/urogyn  please reconsult as necessary      discussed with attending Dr Hathaway

## 2023-02-16 NOTE — PROVIDER CONTACT NOTE (CRITICAL VALUE NOTIFICATION) - PERSON GIVING RESULT:
ELIESER Fernández
Lebron Weeks
lab
Lab: ANNMARIE alejandra
[facility]
Lab [facility]
Aleksandar Lange

## 2023-02-16 NOTE — CONSULT NOTE ADULT - SUBJECTIVE AND OBJECTIVE BOX
22y G0 LMP 2/3/2023  admitted to hospitalist team  for management of DKA and gastroparesis in setting of Type I DM  HD#4    initially presented on 2/13 to [facility] with abdominal pain, distention, decreased appetite and constipation for three days. She was found to have DKA and was admitted for management.     GYN consulted due to abdominal pain in setting of CT abdomen/pelvis noted "malpositioned IUD"      Upon evaluation, the patient reports that she was given suppositories inpatient and abdominal pain has improved since starting to have loose bowel movements on Tuesday (HD#2). Last BM on HD#3.    Today she is tolerating PO, abdominal pain is minimal, and has been passing gas. She reports that she her IUD was placed a year or two ago and it was recommended as a contraceptive and also to manage abnormal uterine bleeding. She and her mother report that her menses has improved, she does not use 1 pad an hr, she has no symptoms of anemia, she gets her period regularly, and that her abdominal pain / bleeding is not bothersome. Mother called Gynecologist at bedside, office reports the patient received a Kyleena which is due out 4/2024    Gyn Hx:  menarche: 12-13  reports pap smear at 18 years old NILM; STI testing at that time yielded +chlamydia, s/p treatment and passed test of cure; in unsure of last STI testing and has been sexually active in the interim  Denies history of cysts or fibroids    OB Hx: denies      PAST MEDICAL HISTORY:  DM I (diabetes mellitus, type I)      PAST SURGICAL HISTORY: denies    SOCIAL:  never smoker, social drinker, no history of recreational drug use     acetaminophen     Tablet .. 650 milliGRAM(s) Oral every 6 hours PRN  cefTRIAXone Injectable. 1000 milliGRAM(s) IV Push every 24 hours  chlorhexidine 4% Liquid 1 Application(s) Topical <User Schedule>  dextrose 5%. 1000 milliLiter(s) IV Continuous <Continuous>  dextrose 5%. 1000 milliLiter(s) IV Continuous <Continuous>  dextrose 50% Injectable 25 Gram(s) IV Push once  dextrose 50% Injectable 12.5 Gram(s) IV Push once  dextrose 50% Injectable 25 Gram(s) IV Push once  dextrose Oral Gel 15 Gram(s) Oral once PRN  escitalopram 10 milliGRAM(s) Oral daily  glucagon  Injectable 1 milliGRAM(s) IntraMuscular once  insulin glargine Injectable (LANTUS) 36 Unit(s) SubCutaneous every morning  insulin lispro (ADMELOG) corrective regimen sliding scale   SubCutaneous at bedtime  insulin lispro (ADMELOG) corrective regimen sliding scale.   SubCutaneous three times a day before meals  potassium chloride    Tablet ER 40 milliEquivalent(s) Oral every 4 hours  potassium chloride  10 mEq/100 mL IVPB 10 milliEquivalent(s) IV Intermittent every 1 hour    Allergies    No Known Allergies    Intolerances        Vital Signs Last 24 Hrs  T(C): 37.1 (16 Feb 2023 15:25), Max: 37.1 (15 Feb 2023 17:28)  T(F): 98.8 (16 Feb 2023 15:25), Max: 98.8 (16 Feb 2023 15:25)  HR: 87 (16 Feb 2023 15:25) (87 - 99)  BP: 118/67 (16 Feb 2023 15:25) (108/58 - 122/72)  BP(mean): 77 (15 Feb 2023 16:00) (77 - 77)  RR: 18 (16 Feb 2023 15:25) (0 - 18)  SpO2: 97% (16 Feb 2023 15:25) (97% - 100%)    Parameters below as of 16 Feb 2023 15:25  Patient On (Oxygen Delivery Method): room air        PHYSICAL EXAM:  GENERAL: NAD  ABDOMEN: Soft, Nontender, Nondistended; Bowel sounds present  PELVIC: will examine pending ultrasound        EXTERNAL GENITALIA        VAGINA         CERVIX        UTERUS        ADNEXA    LABS:                        10.7   7.77  )-----------( 276      ( 16 Feb 2023 07:07 )             33.5     02-16    138  |  107  |  3<L>  ----------------------------<  206<H>  2.7<LL>   |  22  |  0.47<L>    Ca    8.4<L>      16 Feb 2023 07:07  Phos  2.5     02-16  Mg     1.7     02-16                RADIOLOGY STUDIES:  < from: CT Abdomen and Pelvis w/ IV Cont (02.13.23 @ 22:42) >  PROCEDURE DATE:  02/13/2023          INTERPRETATION:  CLINICAL INFORMATION: Left lower quadrant pain.    COMPARISON: None.    CONTRAST/COMPLICATIONS:  IV Contrast: Omnipaque 350  90 cc administered   10 cc discarded  Oral Contrast: NONE  Complications: None reported at time of study completion    PROCEDURE:  CT of the Abdomen and Pelvis was performed.  Sagittal and coronal reformats were performed.    FINDINGS: Exam is mildly degraded by motion artifact.  LOWER CHEST: Within normal limits.    LIVER: Within normal limits.  BILE DUCTS: Normal caliber.  GALLBLADDER: Within normal limits.  SPLEEN: Within normal limits.  PANCREAS: Within normal limits.  ADRENALS: Within normal limits.  KIDNEYS/URETERS: No hydronephrosis. Question linear area of   hypoenhancement within the right interpolar kidney (example 602, 52).    BLADDER: Within normal limits.  REPRODUCTIVE ORGANS: Left corpus luteum. Malpositioned IUD in the lower   uterine segment and extending into the cervix with angulated appearance   of the arms in uncertain location.    BOWEL: No bowel obstruction. Distended stomach. Appendix minimally   distended up to 7 mm with fecal material. Noappendiceal inflammation.  PERITONEUM: Small free pelvic fluid.  VESSELS: Within normal limits.  RETROPERITONEUM/LYMPH NODES: No lymphadenopathy.  ABDOMINAL WALL: Within normal limits.  BONES: Within normal limits.    IMPRESSION:  Exam mildly degraded by motion artifact.    Question linear area of hypoenhancement in the right kidney versus   artifact. Recommend correlation with urinalysis for infection.    Distended stomach.    Minimally distended appendix without associated periappendiceal   inflammation.    Malpositioned IUD.        --- End of Report ---            KIRIT RODRIGUEZ MD; Attending Radiologist  This document has been electronically signed. Feb 13 2023 11:43PM    < end of copied text >     22y G0 LMP 2/3/2023  admitted to hospitalist team  for management of DKA and gastroparesis in setting of Type I DM  HD#4    initially presented on 2/13 to [facility] with abdominal pain, distention, decreased appetite and constipation for three days. She was found to have DKA and was admitted for management.     GYN consulted due to abdominal pain in setting of CT abdomen/pelvis noted "malpositioned IUD"      Upon evaluation, the patient reports that she was given suppositories inpatient and abdominal pain has improved since starting to have loose bowel movements on Tuesday (HD#2). Last BM on HD#3.    Today she is tolerating PO, abdominal pain is minimal, and has been passing gas. She reports that she her IUD was placed a year or two ago and it was recommended as a contraceptive and also to manage abnormal uterine bleeding. She and her mother report that her menses has improved, she does not use 1 pad an hr, she has no symptoms of anemia, she gets her period regularly, and that her abdominal pain / bleeding is not bothersome. Mother called Gynecologist at bedside, office reports the patient received a Kyleena which is due out 4/2024    Gyn Hx:  menarche: 12-13  reports pap smear at 18 years old NILM; STI testing at that time yielded +chlamydia, s/p treatment and passed test of cure; in unsure of last STI testing and has been sexually active in the interim  Denies history of cysts or fibroids    OB Hx: denies      PAST MEDICAL HISTORY:  DM I (diabetes mellitus, type I)      PAST SURGICAL HISTORY: denies    SOCIAL:  never smoker, social drinker, no history of recreational drug use     acetaminophen     Tablet .. 650 milliGRAM(s) Oral every 6 hours PRN  cefTRIAXone Injectable. 1000 milliGRAM(s) IV Push every 24 hours  chlorhexidine 4% Liquid 1 Application(s) Topical <User Schedule>  dextrose 5%. 1000 milliLiter(s) IV Continuous <Continuous>  dextrose 5%. 1000 milliLiter(s) IV Continuous <Continuous>  dextrose 50% Injectable 25 Gram(s) IV Push once  dextrose 50% Injectable 12.5 Gram(s) IV Push once  dextrose 50% Injectable 25 Gram(s) IV Push once  dextrose Oral Gel 15 Gram(s) Oral once PRN  escitalopram 10 milliGRAM(s) Oral daily  glucagon  Injectable 1 milliGRAM(s) IntraMuscular once  insulin glargine Injectable (LANTUS) 36 Unit(s) SubCutaneous every morning  insulin lispro (ADMELOG) corrective regimen sliding scale   SubCutaneous at bedtime  insulin lispro (ADMELOG) corrective regimen sliding scale.   SubCutaneous three times a day before meals  potassium chloride    Tablet ER 40 milliEquivalent(s) Oral every 4 hours  potassium chloride  10 mEq/100 mL IVPB 10 milliEquivalent(s) IV Intermittent every 1 hour    Allergies    No Known Allergies    Intolerances        Vital Signs Last 24 Hrs  T(C): 37.1 (16 Feb 2023 15:25), Max: 37.1 (15 Feb 2023 17:28)  T(F): 98.8 (16 Feb 2023 15:25), Max: 98.8 (16 Feb 2023 15:25)  HR: 87 (16 Feb 2023 15:25) (87 - 99)  BP: 118/67 (16 Feb 2023 15:25) (108/58 - 122/72)  BP(mean): 77 (15 Feb 2023 16:00) (77 - 77)  RR: 18 (16 Feb 2023 15:25) (0 - 18)  SpO2: 97% (16 Feb 2023 15:25) (97% - 100%)    Parameters below as of 16 Feb 2023 15:25  Patient On (Oxygen Delivery Method): room air        PHYSICAL EXAM:  GENERAL: NAD  ABDOMEN: Soft, Nontender, Nondistended; Bowel sounds present  PELVIC: will examine pending ultrasound        EXTERNAL GENITALIA WNL        VAGINA no blood or discharge in vaginal vault         CERVIX strings visualized, IUD in        UTERUS anteverted, 7cm        ADNEXA nonpalpable    LABS:                        10.7   7.77  )-----------( 276      ( 16 Feb 2023 07:07 )             33.5     02-16    138  |  107  |  3<L>  ----------------------------<  206<H>  2.7<LL>   |  22  |  0.47<L>    Ca    8.4<L>      16 Feb 2023 07:07  Phos  2.5     02-16  Mg     1.7     02-16                RADIOLOGY STUDIES:  < from: CT Abdomen and Pelvis w/ IV Cont (02.13.23 @ 22:42) >  PROCEDURE DATE:  02/13/2023          INTERPRETATION:  CLINICAL INFORMATION: Left lower quadrant pain.    COMPARISON: None.    CONTRAST/COMPLICATIONS:  IV Contrast: Omnipaque 350  90 cc administered   10 cc discarded  Oral Contrast: NONE  Complications: None reported at time of study completion    PROCEDURE:  CT of the Abdomen and Pelvis was performed.  Sagittal and coronal reformats were performed.    FINDINGS: Exam is mildly degraded by motion artifact.  LOWER CHEST: Within normal limits.    LIVER: Within normal limits.  BILE DUCTS: Normal caliber.  GALLBLADDER: Within normal limits.  SPLEEN: Within normal limits.  PANCREAS: Within normal limits.  ADRENALS: Within normal limits.  KIDNEYS/URETERS: No hydronephrosis. Question linear area of   hypoenhancement within the right interpolar kidney (example 602, 52).    BLADDER: Within normal limits.  REPRODUCTIVE ORGANS: Left corpus luteum. Malpositioned IUD in the lower   uterine segment and extending into the cervix with angulated appearance   of the arms in uncertain location.    BOWEL: No bowel obstruction. Distended stomach. Appendix minimally   distended up to 7 mm with fecal material. Noappendiceal inflammation.  PERITONEUM: Small free pelvic fluid.  VESSELS: Within normal limits.  RETROPERITONEUM/LYMPH NODES: No lymphadenopathy.  ABDOMINAL WALL: Within normal limits.  BONES: Within normal limits.    IMPRESSION:  Exam mildly degraded by motion artifact.    Question linear area of hypoenhancement in the right kidney versus   artifact. Recommend correlation with urinalysis for infection.    Distended stomach.    Minimally distended appendix without associated periappendiceal   inflammation.    Malpositioned IUD.        --- End of Report ---            KIRIT RODRIGUEZ MD; Attending Radiologist  This document has been electronically signed. Feb 13 2023 11:43PM    < end of copied text >

## 2023-02-16 NOTE — PROGRESS NOTE ADULT - SUBJECTIVE AND OBJECTIVE BOX
Patient is a 22y old  Female who presents with a chief complaint of DKA (15 Feb 2023 09:21)      SUBJECTIVE:   HPI:  22F with h/o type I DM in ED c/o abd pain and constipation x 3 days.      Abdominal pain began on Saturday and patient states that she didn't know when her last bowel movement was. Parents tried giving her laxatives and resulted in a very small BM on Sat, but no passing or any further movements or gas since. She has been burping up food that she has eaten over previous days and complains of abdominal distension. Patients glucose is typically addressed with basal bolus insulin with 36U basal at night and bolus insulin adjustments calculated based on her meals. She has had decreased PO intake secondary to stomach pain and thus insulin dosing has been sporadic. Upon arrival in the ED. she presents with ketoacidosis with a bicarbonate level of 9 and acetone in the blood with ketonuria and glucosuria.  (14 Feb 2023 04:08)      2/16: sitting up in bed, tolerating IV potassium and PO. management and dispo planning discussed with pt and mother       REVIEW OF SYSTEMS:    CONSTITUTIONAL: No weakness, fevers or chills  EYES/ENT: No visual changes;  No vertigo or throat pain   NECK: No pain or stiffness  RESPIRATORY: No cough, wheezing, hemoptysis; No shortness of breath  CARDIOVASCULAR: No chest pain or palpitations  GASTROINTESTINAL: No abdominal or epigastric pain. No nausea, vomiting, or hematemesis; No diarrhea or constipation. No melena or hematochezia.  GENITOURINARY: No dysuria, frequency or hematuria  NEUROLOGICAL: No numbness or weakness  SKIN: No itching, burning, rashes, or lesions   All other review of systems is negative unless indicated above      Vital Signs Last 24 Hrs  T(C): 36.5 (16 Feb 2023 08:11), Max: 37.1 (15 Feb 2023 17:28)  T(F): 97.7 (16 Feb 2023 08:11), Max: 98.7 (15 Feb 2023 17:28)  HR: 89 (16 Feb 2023 08:11) (87 - 104)  BP: 108/58 (16 Feb 2023 08:11) (101/55 - 122/72)  BP(mean): 77 (15 Feb 2023 16:00) (68 - 77)  RR: 18 (16 Feb 2023 08:11) (0 - 18)  SpO2: 99% (16 Feb 2023 08:11) (98% - 100%)    Parameters below as of 16 Feb 2023 08:11  Patient On (Oxygen Delivery Method): room air        I&O's Summary      CAPILLARY BLOOD GLUCOSE    POCT Blood Glucose.: 226 mg/dL (16 Feb 2023 12:40)  POCT Blood Glucose.: 196 mg/dL (16 Feb 2023 07:35)  POCT Blood Glucose.: 252 mg/dL (15 Feb 2023 22:48)  POCT Blood Glucose.: 226 mg/dL (15 Feb 2023 21:27)  POCT Blood Glucose.: 263 mg/dL (15 Feb 2023 15:12)      PHYSICAL EXAM:    Constitutional: NAD, awake and alert, well-developed  HEENT: PERR, EOMI, Normal Hearing, MMM  Neck: Soft and supple, No LAD, No JVD  Respiratory: Breath sounds are clear bilaterally, No wheezing, rales or rhonchi  Cardiovascular: S1 and S2, regular rate and rhythm, no Murmurs, gallops or rubs  Gastrointestinal: Bowel Sounds present, soft, nontender, nondistended, no guarding, no rebound  Extremities: No peripheral edema  Vascular: 2+ peripheral pulses  Neurological: A/O x 3, no focal deficits  Musculoskeletal: 5/5 strength b/l upper and lower extremities  Skin: No rashes      MEDICATIONS:  MEDICATIONS  (STANDING):  cefTRIAXone Injectable. 1000 milliGRAM(s) IV Push every 24 hours  chlorhexidine 4% Liquid 1 Application(s) Topical <User Schedule>  dextrose 5%. 1000 milliLiter(s) (50 mL/Hr) IV Continuous <Continuous>  dextrose 5%. 1000 milliLiter(s) (100 mL/Hr) IV Continuous <Continuous>  dextrose 50% Injectable 25 Gram(s) IV Push once  dextrose 50% Injectable 12.5 Gram(s) IV Push once  dextrose 50% Injectable 25 Gram(s) IV Push once  escitalopram 10 milliGRAM(s) Oral daily  glucagon  Injectable 1 milliGRAM(s) IntraMuscular once  insulin glargine Injectable (LANTUS) 36 Unit(s) SubCutaneous every morning  insulin lispro (ADMELOG) corrective regimen sliding scale   SubCutaneous at bedtime  insulin lispro (ADMELOG) corrective regimen sliding scale.   SubCutaneous three times a day before meals  potassium chloride    Tablet ER 40 milliEquivalent(s) Oral every 4 hours  potassium chloride  10 mEq/100 mL IVPB 10 milliEquivalent(s) IV Intermittent every 1 hour      LABS: All Labs Reviewed:                        10.7   7.77  )-----------( 276      ( 16 Feb 2023 07:07 )             33.5     02-16    138  |  107  |  3<L>  ----------------------------<  206<H>  2.7<LL>   |  22  |  0.47<L>    Ca    8.4<L>      16 Feb 2023 07:07  Phos  2.5     02-16  Mg     1.7     02-16          RADIOLOGY/EKG:    < from: CT Abdomen and Pelvis w/ IV Cont (02.13.23 @ 22:42) >  IMPRESSION:  Exam mildly degraded by motion artifact.    Question linear area of hypoenhancement in the right kidney versus   artifact. Recommend correlation with urinalysis for infection.    Distended stomach.    Minimally distended appendix without associated periappendiceal   inflammation.    Malpositioned IUD.        --- End of Report ---    KIRIT RODRIGUEZ MD; Attending Radiologist  This document has been electronically signed. Feb 13 2023 11:43PM    < end of copied text >    
Events Overnight:  Patient gap closed abdominal pain better, temp to 101    HPI:  22F with h/o type I DM in ED c/o abd pain and constipation x 3 days.    Patient takes tresiba 36 units in am and metformin, with some reflux.   In ED was found to have PH 0f 7.o and in DKA. The patient has been taking her insulin  ct abdomen pelvis, ? pyelonephritis, u/a only 6-10 wbx and no complaints of dysuria         Review of Systems:  Other Review of Systems:  constipation improved , weakness, no fevers, no chills, no dysuria, no sob      has been taking her Insulin  ROS otherwise negative    PMH:        Type I diabetes since age 19    Family hx. Has brother who is diabetic    Social works as bus aid    MEDICATIONS  (STANDING):  acetaminophen   IVPB .. 1000 milliGRAM(s) IV Intermittent once  cefTRIAXone Injectable. 1000 milliGRAM(s) IV Push every 24 hours  chlorhexidine 4% Liquid 1 Application(s) Topical <User Schedule>  dextrose 5%. 1000 milliLiter(s) (50 mL/Hr) IV Continuous <Continuous>  dextrose 5%. 1000 milliLiter(s) (100 mL/Hr) IV Continuous <Continuous>  dextrose 50% Injectable 25 Gram(s) IV Push once  dextrose 50% Injectable 12.5 Gram(s) IV Push once  dextrose 50% Injectable 25 Gram(s) IV Push once  enoxaparin Injectable 40 milliGRAM(s) SubCutaneous every 24 hours  escitalopram 10 milliGRAM(s) Oral daily  glucagon  Injectable 1 milliGRAM(s) IntraMuscular once  insulin glargine Injectable (LANTUS) 36 Unit(s) SubCutaneous once  insulin glargine Injectable (LANTUS) 36 Unit(s) SubCutaneous every morning  insulin lispro (ADMELOG) corrective regimen sliding scale   SubCutaneous three times a day before meals  insulin lispro (ADMELOG) corrective regimen sliding scale   SubCutaneous at bedtime  insulin regular Infusion 3 Unit(s)/Hr (3 mL/Hr) IV Continuous <Continuous>  potassium chloride    Tablet ER 40 milliEquivalent(s) Oral every 4 hours  sodium phosphate 15 milliMole(s)/250 mL IVPB 15 milliMole(s) IV Intermittent once    MEDICATIONS  (PRN):  acetaminophen     Tablet .. 650 milliGRAM(s) Oral every 6 hours PRN Mild Pain (1 - 3)  dextrose Oral Gel 15 Gram(s) Oral once PRN Blood Glucose LESS THAN 70 milliGRAM(s)/deciliter      ICU Vital Signs Last 24 Hrs  T(C): 36.8 (15 Feb 2023 08:53), Max: 38.4 (15 Feb 2023 00:00)  T(F): 98.2 (15 Feb 2023 08:53), Max: 101.1 (15 Feb 2023 00:00)  HR: 101 (15 Feb 2023 08:45) (90 - 124)  BP: 111/64 (15 Feb 2023 08:45) (100/51 - 129/78)  BP(mean): 72 (15 Feb 2023 08:45) (61 - 90)  ABP: --  ABP(mean): --  RR: 23 (15 Feb 2023 08:45) (17 - 26)  SpO2: 100% (15 Feb 2023 08:45) (92% - 100%)    O2 Parameters below as of 15 Feb 2023 06:00  Patient On (Oxygen Delivery Method): room air    Physical Exam    General no distress  heent nc/at  neck supple  lungs clear  cv rrr  abdomen soft, slight distended soft   no cva tenderness  extremtiies warm    I&O's Summary    2023 07:01  -  15 Feb 2023 07:00  --------------------------------------------------------  IN: 4568.7 mL / OUT: 2500 mL / NET: 2068.7 mL                          11.4   8.81  )-----------( 276      ( 15 Feb 2023 05:51 )             35.7       02-15    136  |  109<H>  |  3<L>  ----------------------------<  213<H>  2.8<LL>   |  18<L>  |  0.50    Ca    8.5      15 Feb 2023 05:51  Phos  1.7     02-15  Mg     2.0     02-14    TPro  9.6<H>  /  Alb  4.1  /  TBili  0.3  /  DBili  x   /  AST  10<L>  /  ALT  15  /  AlkPhos  136<H>  02-    Urinalysis Basic - ( 2023 02:00 )    Color: Yellow / Appearance: Clear / S.020 / pH: x  Gluc: x / Ketone: Large  / Bili: Negative / Urobili: Negative   Blood: x / Protein: 15 / Nitrite: Negative   Leuk Esterase: Trace / RBC: 3-5 /HPF / WBC 6-10 /HPF   Sq Epi: x / Non Sq Epi: Moderate / Bacteria: Few    DVT Prophylaxis:   ambulate low risk                                                              Advanced Directives: Ful Code                     
    HPI:      22F with h/o type I DM in ED c/o abd pain and constipation x 3 days.    Patient takes tresiba and metformin, with some reflux.   In ED was found to have PH 0f 7.o and in DKA. The patient has been taking her insulin  ct abdomen pelvis, ? pyelonephritis, u/a only 6-10 wbx and no complaints of dysuria         Review of Systems:  Other Review of Systems:  constipation, weakness, no fevers, no chills, no dysuria, no sob      has been taking her Insulin    PMH:        Type I diabetes since age 19    Family hx. Has brother who is diabetic    Social works as bus aid         MEDICATIONS  (STANDING):  cefTRIAXone Injectable. 1000 milliGRAM(s) IV Push every 24 hours  chlorhexidine 4% Liquid 1 Application(s) Topical <User Schedule>  dextrose 5% + lactated ringers. 1000 milliLiter(s) (125 mL/Hr) IV Continuous <Continuous>  enoxaparin Injectable 40 milliGRAM(s) SubCutaneous every 24 hours  insulin regular Infusion 4 Unit(s)/Hr (4 mL/Hr) IV Continuous <Continuous>  polyethylene glycol 3350 17 Gram(s) Oral once  potassium phosphate IVPB 30 milliMole(s) IV Intermittent once      Height (cm): 157.5 ( @ 20:52)  Weight (kg): 80.3 ( @ 20:52)  BMI (kg/m2): 32.4 ( @ 20:52)    ICU Vital Signs Last 24 Hrs  T(C): 36.8 (2023 04:00), Max: 37.4 (2023 20:54)  T(F): 98.2 (2023 04:00), Max: 99.3 (2023 20:54)  HR: 109 (2023 08:00) (109 - 145)  BP: 109/67 (2023 08:00) (109/67 - 141/95)  BP(mean): 75 (2023 08:00) (75 - 109)  ABP: --  ABP(mean): --  RR: 24 (2023 08:00) (16 - 24)  SpO2: 100% (2023 08:00) (96% - 100%)    O2 Parameters below as of 2023 07:00  Patient On (Oxygen Delivery Method): room air    Physical Exam    General no distress  heent nc/at  neck supple  lungs clear  cv rrr  abdomen soft, slight distended soft   no cva tenderness  extremtiies warm      I&O's Summary    2023 07:01  -  2023 07:00  --------------------------------------------------------  IN: 1121.9 mL / OUT: 850 mL / NET: 271.9 mL                          12.3   12.60 )-----------( 290      ( 2023 05:29 )             40.4       02-14    136  |  113<H>  |  8   ----------------------------<  218<H>  3.6   |  6<LL>  |  0.69    Ca    8.5      2023 05:29  Phos  0.9     02-14  Mg     2.0     02-14    TPro  9.6<H>  /  Alb  4.1  /  TBili  0.3  /  DBili  x   /  AST  10<L>  /  ALT  15  /  AlkPhos  136<H>  02-13     Urinalysis Basic - ( 2023 02:00 )    Color: Yellow / Appearance: Clear / S.020 / pH: x  Gluc: x / Ketone: Large  / Bili: Negative / Urobili: Negative   Blood: x / Protein: 15 / Nitrite: Negative   Leuk Esterase: Trace / RBC: 3-5 /HPF / WBC 6-10 /HPF   Sq Epi: x / Non Sq Epi: Moderate / Bacteria: Few    DVT Prophylaxis:   Lovenox                                                              Advanced Directives: Full Code

## 2023-02-16 NOTE — PROGRESS NOTE ADULT - ASSESSMENT
21 y/o female w/ pmhx of insulin dependent type 1 diabetes presenting with abd pain and constipation x 3 days. presenting with decreased oral intake, found to be in DKA     # diabetic ketoacidosis   # hypokalemia   - downgraded from ICU   - s/p insulin infusion and 2 amps of bicarb   - stable on lantus 36 units AM and ISS   - no clear insult / anticedent   - CT scan with possible pyelo - urine culture negative receiving Rocephin   - monitor and trend electrolytes and replete PRN     # abd pain / constipation / abnormal CT   - bowel regimen as above  - possible malpositioned IUD   - pending pelvic ultrasound to determine position/ possible source of infection   - GYN consulted     above plan discussed with pt, mother, bedside RN, and MD Matta

## 2023-02-16 NOTE — PROVIDER CONTACT NOTE (MEDICATION) - ASSESSMENT
Patient did not have orders to have insulin checks/coverage for dinner meal and no insulin administered with dinner, mother at bed side and concerned.

## 2023-02-17 ENCOUNTER — TRANSCRIPTION ENCOUNTER (OUTPATIENT)
Age: 23
End: 2023-02-17

## 2023-02-17 VITALS
DIASTOLIC BLOOD PRESSURE: 58 MMHG | RESPIRATION RATE: 18 BRPM | OXYGEN SATURATION: 100 % | SYSTOLIC BLOOD PRESSURE: 100 MMHG | TEMPERATURE: 98 F | HEART RATE: 83 BPM

## 2023-02-17 LAB
ALBUMIN SERPL ELPH-MCNC: 2.7 G/DL — LOW (ref 3.3–5)
ALP SERPL-CCNC: 88 U/L — SIGNIFICANT CHANGE UP (ref 40–120)
ALT FLD-CCNC: 36 U/L — SIGNIFICANT CHANGE UP (ref 12–78)
ANION GAP SERPL CALC-SCNC: 7 MMOL/L — SIGNIFICANT CHANGE UP (ref 5–17)
AST SERPL-CCNC: 21 U/L — SIGNIFICANT CHANGE UP (ref 15–37)
BILIRUB SERPL-MCNC: 0.2 MG/DL — SIGNIFICANT CHANGE UP (ref 0.2–1.2)
BUN SERPL-MCNC: 6 MG/DL — LOW (ref 7–23)
C TRACH RRNA SPEC QL NAA+PROBE: SIGNIFICANT CHANGE UP
CALCIUM SERPL-MCNC: 8.9 MG/DL — SIGNIFICANT CHANGE UP (ref 8.5–10.1)
CHLORIDE SERPL-SCNC: 106 MMOL/L — SIGNIFICANT CHANGE UP (ref 96–108)
CO2 SERPL-SCNC: 23 MMOL/L — SIGNIFICANT CHANGE UP (ref 22–31)
CREAT SERPL-MCNC: 0.42 MG/DL — LOW (ref 0.5–1.3)
EGFR: 143 ML/MIN/1.73M2 — SIGNIFICANT CHANGE UP
GLUCOSE BLDC GLUCOMTR-MCNC: 200 MG/DL — HIGH (ref 70–99)
GLUCOSE SERPL-MCNC: 225 MG/DL — HIGH (ref 70–99)
N GONORRHOEA RRNA SPEC QL NAA+PROBE: SIGNIFICANT CHANGE UP
POTASSIUM SERPL-MCNC: 3.7 MMOL/L — SIGNIFICANT CHANGE UP (ref 3.5–5.3)
POTASSIUM SERPL-SCNC: 3.7 MMOL/L — SIGNIFICANT CHANGE UP (ref 3.5–5.3)
PROT SERPL-MCNC: 6.7 GM/DL — SIGNIFICANT CHANGE UP (ref 6–8.3)
SODIUM SERPL-SCNC: 136 MMOL/L — SIGNIFICANT CHANGE UP (ref 135–145)

## 2023-02-17 PROCEDURE — 99239 HOSP IP/OBS DSCHRG MGMT >30: CPT

## 2023-02-17 RX ORDER — METFORMIN HYDROCHLORIDE 850 MG/1
1 TABLET ORAL
Qty: 0 | Refills: 0 | DISCHARGE

## 2023-02-17 RX ORDER — INSULIN LISPRO 100/ML
1 VIAL (ML) SUBCUTANEOUS
Qty: 0 | Refills: 0 | DISCHARGE

## 2023-02-17 RX ORDER — INSULIN LISPRO 100/ML
0 VIAL (ML) SUBCUTANEOUS
Qty: 0 | Refills: 0 | DISCHARGE

## 2023-02-17 RX ORDER — CEFUROXIME AXETIL 250 MG
1 TABLET ORAL
Qty: 4 | Refills: 0
Start: 2023-02-17 | End: 2023-02-18

## 2023-02-17 RX ADMIN — CEFTRIAXONE 1000 MILLIGRAM(S): 500 INJECTION, POWDER, FOR SOLUTION INTRAMUSCULAR; INTRAVENOUS at 05:09

## 2023-02-17 RX ADMIN — Medication 2: at 08:24

## 2023-02-17 RX ADMIN — INSULIN GLARGINE 36 UNIT(S): 100 INJECTION, SOLUTION SUBCUTANEOUS at 08:24

## 2023-02-17 RX ADMIN — CHLORHEXIDINE GLUCONATE 1 APPLICATION(S): 213 SOLUTION TOPICAL at 05:10

## 2023-02-17 NOTE — DISCHARGE NOTE PROVIDER - NSDCMRMEDTOKEN_GEN_ALL_CORE_FT
cefuroxime 500 mg oral tablet: 1 tab(s) orally 2 times a day   HumaLOG 100 units/mL subcutaneous solution: 1  subcutaneous 3 times a day (with meals)  hydrOXYzine hydrochloride 25 mg oral tablet: 1 tab(s) orally once a day (at bedtime), As Needed  levocetirizine 5 mg oral tablet: 1 tab(s) orally once a day  Lexapro 10 mg oral tablet: 1 tab(s) orally once a day  Tresiba 100 units/mL subcutaneous solution: 36 unit(s) subcutaneous once a day (in the morning)

## 2023-02-17 NOTE — DISCHARGE NOTE PROVIDER - CARE PROVIDERS DIRECT ADDRESSES
,DirectAddress_Unknown,[email or web address]

## 2023-02-17 NOTE — DISCHARGE NOTE PROVIDER - NSDCPNSUBOBJ_GEN_ALL_CORE
ros 10 systems reviewed and negative unless otherwise mentioned     `Vital Signs Last 24 Hrs  T(C): 36.6 (17 Feb 2023 08:47), Max: 37.1 (16 Feb 2023 15:25)  T(F): 97.9 (17 Feb 2023 08:47), Max: 98.8 (16 Feb 2023 15:25)  HR: 83 (17 Feb 2023 08:47) (83 - 87)  BP: 100/58 (17 Feb 2023 08:47) (100/58 - 118/67)  BP(mean): --  RR: 18 (17 Feb 2023 08:47) (18 - 18)  SpO2: 100% (17 Feb 2023 08:47) (97% - 100%) --- room air      LABS                         10.7   7.77  )-----------( 276      ( 16 Feb 2023 07:07 )             33.5     02-17    136  |  106  |  6<L>  ----------------------------<  225<H>  3.7   |  23  |  0.42<L>    Ca    8.9      17 Feb 2023 07:56  Phos  2.5     02-16  Mg     1.7     02-16    TPro  6.7  /  Alb  2.7<L>  /  TBili  0.2  /  DBili  x   /  AST  21  /  ALT  36  /  AlkPhos  88  02-17    LIVER FUNCTIONS - ( 17 Feb 2023 07:56 )  Alb: 2.7 g/dL / Pro: 6.7 gm/dL / ALK PHOS: 88 U/L / ALT: 36 U/L / AST: 21 U/L / GGT: x           A1C with Estimated Average Glucose Result: 15.2       PHYSICAL EXAM:    Constitutional: NAD, awake and alert, well-developed  HEENT: PERR, EOMI, Normal Hearing, MMM  Neck: Soft and supple, No LAD, No JVD  Respiratory: Breath sounds are clear bilaterally, No wheezing, rales or rhonchi  Cardiovascular: S1 and S2, regular rate and rhythm, no Murmurs, gallops or rubs  Gastrointestinal: Bowel Sounds present, soft, nontender, nondistended, no guarding, no rebound  Extremities: No peripheral edema  Vascular: 2+ peripheral pulses  Neurological: A/O x 3, no focal deficits  Musculoskeletal: 5/5 strength b/l upper and lower extremities  Skin: No rashes        < from: CT Abdomen and Pelvis w/ IV Cont (02.13.23 @ 22:42) >    IMPRESSION:  Exam mildly degraded by motion artifact.  Question linear area of hypoenhancement in the right kidney versus artifact. Recommend correlation with urinalysis for infection.  Distended stomach.  Minimally distended appendix without associated periappendiceal inflammation.  Malpositioned IUD.  --- End of Report ---  < end of copied text >    < from: US Doppler Pelvis (02.16.23 @ 16:16) >    IMPRESSION:  Malpositioned IUD.    --- End of Report ---    SELMA GOLDBERG MD; Attending Radiologist  This document has been electronically signed. Feb 16 2023  4:23PM    < end of copied text >

## 2023-02-17 NOTE — DISCHARGE NOTE PROVIDER - HOSPITAL COURSE
HPI:  22F with h/o type I DM in ED c/o abd pain and constipation x 3 days.      Abdominal pain began on Saturday and patient states that she didn't know when her last bowel movement was. Parents tried giving her laxatives and resulted in a very small BM on Sat, but no passing or any further movements or gas since. She has been burping up food that she has eaten over previous days and complains of abdominal distension. Patients glucose is typically addressed with basal bolus insulin with 36U basal at night and bolus insulin adjustments calculated based on her meals. She has had decreased PO intake secondary to stomach pain and thus insulin dosing has been sporadic. Upon arrival in the ED. she presents with ketoacidosis with a bicarbonate level of 9 and acetone in the blood with ketonuria and glucosuria.  (14 Feb 2023 04:08)    hosptial course:   pt admitted for constipation , was started on bowel regimen and DKA. was placed on insulin infusion acidosis resolved. A1C of 15. CT scan with concerns for pyelonephritis was placed on ceftrixone for 3 days. will cont 2 more days of Ceftin  also with concerns for malpositioned IUD - - GYN evaled determined that IUD was in place, reccs for out pt follow up and voiding journal to prevent overflow incontinence. patient's mother is considering making appointment with her own urogyn, Dr Turner; potasisum was corrected. insulin was titrated - no other issues during admission. tolerating PO intake.       # diabetic ketoacidosis   # hypokalemia ---- resolved   - downgraded from ICU   - s/p insulin infusion and 2 amps of bicarb   - stable on lantus 36 units AM and ISS ----- resume home medications   - no clear insult / anticedent   - CT scan with possible pyelo - urine culture negative receiving Rocephin will dc on Ceftin 500 for 2 more days   - trended electrolytes and replete PRN     # abd pain / constipation / abnormal CT   - bowel regimen as above  - possible malpositioned IUD --- evaled GYN - no concerns of malpositoin - can follow up out p t  - pelvic ultrasound as below   - GYN consulted         spent _49___ mins preparing dc.   above plan discussed with pt, mother at bedside, bedside RN and MD Xavier HPI:  22F with h/o type I DM in ED c/o abd pain and constipation x 3 days.      Abdominal pain began on Saturday and patient states that she didn't know when her last bowel movement was. Parents tried giving her laxatives and resulted in a very small BM on Sat, but no passing or any further movements or gas since. She has been burping up food that she has eaten over previous days and complains of abdominal distension. Patients glucose is typically addressed with basal bolus insulin with 36U basal at night and bolus insulin adjustments calculated based on her meals. She has had decreased PO intake secondary to stomach pain and thus insulin dosing has been sporadic. Upon arrival in the ED. she presents with ketoacidosis with a bicarbonate level of 9 and acetone in the blood with ketonuria and glucosuria.  (14 Feb 2023 04:08)    hosptial course:   pt admitted for constipation , was started on bowel regimen and DKA. was placed on insulin infusion acidosis resolved. A1C of 15. CT scan with concerns for pyelonephritis was placed on ceftrixone for 3 days. will cont 2 more days of Ceftin  also with concerns for malpositioned IUD - - GYN evaled determined that IUD was in place, reccs for out pt follow up and voiding journal to prevent overflow incontinence. patient's mother is considering making appointment with her own urogyn, Dr Turner; potasisum was corrected. insulin was titrated - no other issues during admission. tolerating PO intake.       # diabetic ketoacidosis   # hypokalemia ---- resolved   - downgraded from ICU   - s/p insulin infusion and 2 amps of bicarb   - stable on lantus 36 units AM and ISS ----- resume home medications   - no clear insult / anticedent   - CT scan with possible pyelo - urine culture negative receiving Rocephin will dc on Ceftin 500 for 2 more days   - trended electrolytes and replete PRN     # abd pain / constipation / abnormal CT   - bowel regimen as above  - possible malpositioned IUD --- evaled GYN - no concerns of malpositoin - can follow up out p t  - pelvic ultrasound as below   - GYN consulted         spent _49___ mins preparing dc.   above plan discussed with pt, mother at bedside, bedside RN and MD Xavier     Attending Attestation:   I agree with the assessment and plan of PATRICK Burt as stated and discussed.

## 2023-02-17 NOTE — PROVIDER CONTACT NOTE (OTHER) - SITUATION
notified Silvia that patient was  admitted  to the hospital and stated that patient plans to follow up with Dr Steiner after discharged
notified Dr Lazaro's office of admission please fax over discharge paperwork to [phone]

## 2023-02-17 NOTE — DISCHARGE NOTE PROVIDER - NSDCFUSCHEDAPPT_GEN_ALL_CORE_FT
Pratik Cruz  [facility]  DERM [address]  Scheduled Appointment: 03/28/2023

## 2023-02-17 NOTE — DISCHARGE NOTE PROVIDER - NSDCCPCAREPLAN_GEN_ALL_CORE_FT
PRINCIPAL DISCHARGE DIAGNOSIS  Diagnosis: DKA, type 1  Assessment and Plan of Treatment: DKA is a life-threatening condition caused by dangerously high blood sugar levels. Your blood sugar levels become high because your body does not have enough insulin. Insulin helps move sugar out of the blood so it can be used for energy. The lack of insulin forces your body to use fat instead of sugar for energy. As fats are broken down, they leave chemicals called ketones that build up in your blood. Ketones are dangerous at high levelsCall your local emergency number (911 in the [place]) or have someone call if:   •You have a seizure.  •You begin to breathe fast, or are short of breath.  •You become weak and confused.  •You are more drowsy than usual.  •You have fruity, sweet breath.  •You have severe, new stomach pain and are vomiting.  •Your blood sugar level is lower or higher than your healthcare provider says it should be.  •You have ketones in your blood or urine.  •You have a fever or chills.  •You are more thirsty than usual.  •You are urinating more often than usual.  •You have questions or concerns about your condition or care.      SECONDARY DISCHARGE DIAGNOSES  Diagnosis: Abdominal pain  Assessment and Plan of Treatment:

## 2023-02-17 NOTE — DISCHARGE NOTE PROVIDER - NSDCCAREPROVSEEN_GEN_ALL_CORE_FT
Paxton, Leilani Goldman, Modesto Hua, Jose Xavier, Duncan Sheldon, Ester Burt, Shiela Duncan, Ba Fields, Dylon Hill, Gini REYES

## 2023-02-17 NOTE — DISCHARGE NOTE PROVIDER - CARE PROVIDER_API CALL
AMI GERMAN  Pediatrics  [address], [address]  [place] [address]  Phone: [phone]  Fax: [phone])-  Follow Up Time:     Barbara Turner)  Obstetrics and Gynecology  [address] [place] [address]  Phone: [phone]  Fax: [phone]  Follow Up Time:

## 2023-02-17 NOTE — DISCHARGE NOTE NURSING/CASE MANAGEMENT/SOCIAL WORK - NSDCPEFALRISK_GEN_ALL_CORE
For information on Fall & Injury Prevention, visit: [email or web address] OR  [email or web address] OR  https://www.cdc.gov/steadi/patient.html

## 2023-02-17 NOTE — DISCHARGE NOTE NURSING/CASE MANAGEMENT/SOCIAL WORK - PATIENT PORTAL LINK FT
You can access the FollowMyHealth Patient Portal offered by [facility] by registering at the following website: [email or web address]. By joining [employer]’s FollowMyHealth portal, you will also be able to view your health information using other applications (apps) compatible with our system.

## 2023-02-19 LAB
CULTURE RESULTS: SIGNIFICANT CHANGE UP
CULTURE RESULTS: SIGNIFICANT CHANGE UP
SPECIMEN SOURCE: SIGNIFICANT CHANGE UP
SPECIMEN SOURCE: SIGNIFICANT CHANGE UP

## 2023-02-21 DIAGNOSIS — T83.32XA DISPLACEMENT OF INTRAUTERINE CONTRACEPTIVE DEVICE, INITIAL ENCOUNTER: ICD-10-CM

## 2023-02-21 DIAGNOSIS — E10.10 TYPE 1 DIABETES MELLITUS WITH KETOACIDOSIS WITHOUT COMA: ICD-10-CM

## 2023-02-21 DIAGNOSIS — K31.84 GASTROPARESIS: ICD-10-CM
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
## 2023-02-21 DIAGNOSIS — E10.43 TYPE 1 DIABETES MELLITUS WITH DIABETIC AUTONOMIC (POLY)NEUROPATHY: ICD-10-CM

## 2023-02-21 DIAGNOSIS — E87.6 HYPOKALEMIA: ICD-10-CM

## 2023-02-21 DIAGNOSIS — K59.00 CONSTIPATION, UNSPECIFIED: ICD-10-CM

## 2023-02-21 DIAGNOSIS — N12 TUBULO-INTERSTITIAL NEPHRITIS, NOT SPECIFIED AS ACUTE OR CHRONIC: ICD-10-CM
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
## 2023-03-28 ENCOUNTER — APPOINTMENT (OUTPATIENT)
Dept: DERMATOLOGY | Facility: CLINIC | Age: 23
End: 2023-03-28

## 2023-11-20 ENCOUNTER — NON-APPOINTMENT (OUTPATIENT)
Age: 23
End: 2023-11-20